# Patient Record
Sex: FEMALE | Race: OTHER | NOT HISPANIC OR LATINO
[De-identification: names, ages, dates, MRNs, and addresses within clinical notes are randomized per-mention and may not be internally consistent; named-entity substitution may affect disease eponyms.]

---

## 2017-06-27 ENCOUNTER — APPOINTMENT (OUTPATIENT)
Dept: CARDIOLOGY | Facility: CLINIC | Age: 67
End: 2017-06-27

## 2017-11-30 ENCOUNTER — CHARTING TRANS (OUTPATIENT)
Dept: OTHER | Age: 67
End: 2017-11-30

## 2017-12-12 ENCOUNTER — APPOINTMENT (OUTPATIENT)
Dept: CARDIOLOGY | Facility: CLINIC | Age: 67
End: 2017-12-12
Payer: MEDICARE

## 2017-12-12 VITALS
HEART RATE: 66 BPM | SYSTOLIC BLOOD PRESSURE: 120 MMHG | WEIGHT: 190 LBS | DIASTOLIC BLOOD PRESSURE: 80 MMHG | BODY MASS INDEX: 34.96 KG/M2 | HEIGHT: 62 IN

## 2017-12-12 DIAGNOSIS — Z80.3 FAMILY HISTORY OF MALIGNANT NEOPLASM OF BREAST: ICD-10-CM

## 2017-12-12 PROCEDURE — 93306 TTE W/DOPPLER COMPLETE: CPT

## 2017-12-12 PROCEDURE — 99214 OFFICE O/P EST MOD 30 MIN: CPT

## 2018-01-12 ENCOUNTER — MEDICATION RENEWAL (OUTPATIENT)
Age: 68
End: 2018-01-12

## 2018-01-22 ENCOUNTER — RX RENEWAL (OUTPATIENT)
Age: 68
End: 2018-01-22

## 2018-03-15 ENCOUNTER — MEDICATION RENEWAL (OUTPATIENT)
Age: 68
End: 2018-03-15

## 2018-06-12 ENCOUNTER — NON-APPOINTMENT (OUTPATIENT)
Age: 68
End: 2018-06-12

## 2018-06-12 ENCOUNTER — APPOINTMENT (OUTPATIENT)
Dept: CARDIOLOGY | Facility: CLINIC | Age: 68
End: 2018-06-12
Payer: MEDICARE

## 2018-06-12 VITALS
WEIGHT: 197 LBS | DIASTOLIC BLOOD PRESSURE: 72 MMHG | SYSTOLIC BLOOD PRESSURE: 118 MMHG | BODY MASS INDEX: 36.25 KG/M2 | HEIGHT: 62 IN | OXYGEN SATURATION: 95 % | HEART RATE: 61 BPM

## 2018-06-12 DIAGNOSIS — Z87.09 PERSONAL HISTORY OF OTHER DISEASES OF THE RESPIRATORY SYSTEM: ICD-10-CM

## 2018-06-12 DIAGNOSIS — Z87.898 PERSONAL HISTORY OF OTHER SPECIFIED CONDITIONS: ICD-10-CM

## 2018-06-12 PROCEDURE — 93000 ELECTROCARDIOGRAM COMPLETE: CPT

## 2018-06-12 PROCEDURE — 99214 OFFICE O/P EST MOD 30 MIN: CPT

## 2018-09-24 ENCOUNTER — MEDICATION RENEWAL (OUTPATIENT)
Age: 68
End: 2018-09-24

## 2018-12-17 ENCOUNTER — RX RENEWAL (OUTPATIENT)
Age: 68
End: 2018-12-17

## 2019-01-24 ENCOUNTER — MEDICATION RENEWAL (OUTPATIENT)
Age: 69
End: 2019-01-24

## 2019-02-22 ENCOUNTER — RECORD ABSTRACTING (OUTPATIENT)
Age: 69
End: 2019-02-22

## 2019-02-25 ENCOUNTER — APPOINTMENT (OUTPATIENT)
Dept: CARDIOLOGY | Facility: CLINIC | Age: 69
End: 2019-02-25
Payer: MEDICARE

## 2019-02-25 VITALS
SYSTOLIC BLOOD PRESSURE: 110 MMHG | BODY MASS INDEX: 37.17 KG/M2 | OXYGEN SATURATION: 95 % | HEART RATE: 90 BPM | HEIGHT: 62 IN | WEIGHT: 202 LBS | DIASTOLIC BLOOD PRESSURE: 70 MMHG

## 2019-02-25 PROCEDURE — 99214 OFFICE O/P EST MOD 30 MIN: CPT

## 2019-02-25 NOTE — DISCUSSION/SUMMARY
[FreeTextEntry1] : \par #1 atrial fibrillation/chronic: Stable labs. Continue rate control. Continue anticoagulation. Watch for bleeding. Repeat CBC, CMP, in 6 months.\par \par #2 history of nonischemic cardiomyopathy with an single-vessel coronary artery disease. Nonischemic. Iimproved LV systolic function appears stable. Echocardiogram ordered for followup of ejection fraction. Also, to evaluate pulmonary artery systolic pressure and mitral regurgitation.Class II functional status. Continue present medications.\par \par #3 coronary atherosclerosis. A CCS class 0. On beta blockers be on statin therapy. On chronic anticoagulation. Not on aspirin. Weight reduction recommended. Ejection fraction preserved at present.\par LDL cholesterol is 72. Low dose of atorvastatin. Unable to tolerate higher dosage.\par \par #4 hyperglycemia. Low carbohydrate diet. Decreasing leg pain, increasing exercise.\par \par #5 Carotid Doppler study has been ordered for evaluation of carotid atherosclerosis in presence of history of coronary atherosclerosis and smoking.\par Her age, former history of smoking, coronary atherosclerotic vascular disease. She is also recommended to have this abdominal aortic ultrasound to rule out any significant abdominal aortic aneurysm.\par BMI goal closer to 25. Counseling done in relation to diet, exercise, and weight.\par \par Counseling regarding low saturated fat, salt and carbohydrate intake was reviewed. Active lifestyle and regular. Exercise along with weight management is advised.\par All the above were at length reviewed. Answered all the questions. Thank you very much for this kind referral. Please do not hesitate to give me a call for any question.\par Part of this transcription was done with voice recognition software and phonetically similar errors are common. I apologize for that. Please donot hesitate to call for any questions due to above.\par

## 2019-02-25 NOTE — ASSESSMENT
[FreeTextEntry1] : Reviewed on June 12, 2018.\par EKG ordered and interpreted by me on June 12, 2018. Indication atrial fibrillation. Interpretation. Atrial fibrillation. Stable ventricular rate nonspecific ST-T changes.\par \par As reviewed her labs. January 2019. Stable. CBC, CMP. Glucose mildly elevated

## 2019-02-25 NOTE — REASON FOR VISIT
[Follow-Up - Clinic] : a clinic follow-up of [Atrial Fibrillation] : atrial fibrillation [Cardiomyopathy] : cardiomyopathy [Hyperlipidemia] : hyperlipidemia [FreeTextEntry1] : \par 68-year-old comes in for followup consultation today. Review her labs.\par She has not been controlling her diet and does not do regular exercise.\par She has gained weight.\par She has no chest\par No PND, orthopnea, palpitation.\par No bleeding complications.\par No nausea, vomiting, diarrhea.\par No change in medications.\par No recent hospital admission.

## 2019-02-25 NOTE — HISTORY OF PRESENT ILLNESS
[FreeTextEntry1] : Her other active medical problems as noted below.\par \par Atrial Fibrillation: persistent rate controlled. On anticoagulation\par \par •Coronary artery disease, one vessel, small, not intervenable candidate. Continue risk factor modification. No chest pain.CCS class is 0.  On anticoagulation.  Not on aspirin due to anticoagulation and risk of bleeding.  On beta blockers.  On statin therapy.  No active smoking. Former smoker\par \par •Cardiomyopathy, idiopathic, on maximized dose of ACE inhibitor and beta-blockers, improved LVEF.Class 1-2.  Nonsmoker.  No significant alcohol intake.  LV ejection fraction normalized.\par \par •Dyslipidemia, stable on statin therapy and lifestyle modifications.\par \par •abnormal glucose . prediabetes\par

## 2019-02-25 NOTE — PHYSICAL EXAM
[General Appearance - Well Developed] : well developed [Normal Appearance] : normal appearance [Well Groomed] : well groomed [General Appearance - Well Nourished] : well nourished [No Deformities] : no deformities [General Appearance - In No Acute Distress] : no acute distress [Normal Conjunctiva] : the conjunctiva exhibited no abnormalities [No Oral Pallor] : no oral pallor [Normal Jugular Venous A Waves Present] : normal jugular venous A waves present [Normal Jugular Venous V Waves Present] : normal jugular venous V waves present [No Jugular Venous Willams A Waves] : no jugular venous willams A waves [Respiration, Rhythm And Depth] : normal respiratory rhythm and effort [Exaggerated Use Of Accessory Muscles For Inspiration] : no accessory muscle use [Auscultation Breath Sounds / Voice Sounds] : lungs were clear to auscultation bilaterally [Heart Rate And Rhythm] : heart rate and rhythm were normal [Arterial Pulses Normal] : the arterial pulses were normal [Edema] : no peripheral edema present [Veins - Varicosity Changes] : no varicosital changes were noted in the lower extremities [FreeTextEntry1] : irregular irregular, msm , Decreased carotid upstroke. No gallop, or rub [Abnormal Walk] : normal gait [Gait - Sufficient For Exercise Testing] : the gait was sufficient for exercise testing [Nail Clubbing] : no clubbing of the fingernails [Cyanosis, Localized] : no localized cyanosis [Petechial Hemorrhages (___cm)] : no petechial hemorrhages [Skin Color & Pigmentation] : normal skin color and pigmentation [] : no rash [No Venous Stasis] : no venous stasis [No Xanthoma] : no  xanthoma was observed [Oriented To Time, Place, And Person] : oriented to person, place, and time [Affect] : the affect was normal [Mood] : the mood was normal [No Anxiety] : not feeling anxious

## 2019-03-20 ENCOUNTER — APPOINTMENT (OUTPATIENT)
Dept: CARDIOLOGY | Facility: CLINIC | Age: 69
End: 2019-03-20
Payer: MEDICARE

## 2019-03-20 PROCEDURE — 93306 TTE W/DOPPLER COMPLETE: CPT

## 2019-03-20 PROCEDURE — 93979 VASCULAR STUDY: CPT

## 2019-03-20 PROCEDURE — 93880 EXTRACRANIAL BILAT STUDY: CPT

## 2019-03-21 ENCOUNTER — APPOINTMENT (OUTPATIENT)
Dept: CARDIOLOGY | Facility: CLINIC | Age: 69
End: 2019-03-21

## 2019-07-20 ENCOUNTER — RX RENEWAL (OUTPATIENT)
Age: 69
End: 2019-07-20

## 2019-08-07 DIAGNOSIS — E83.52 HYPERCALCEMIA: ICD-10-CM

## 2019-08-26 ENCOUNTER — NON-APPOINTMENT (OUTPATIENT)
Age: 69
End: 2019-08-26

## 2019-08-26 ENCOUNTER — APPOINTMENT (OUTPATIENT)
Dept: CARDIOLOGY | Facility: CLINIC | Age: 69
End: 2019-08-26
Payer: MEDICARE

## 2019-08-26 VITALS
OXYGEN SATURATION: 97 % | DIASTOLIC BLOOD PRESSURE: 70 MMHG | WEIGHT: 200 LBS | SYSTOLIC BLOOD PRESSURE: 116 MMHG | BODY MASS INDEX: 36.8 KG/M2 | HEART RATE: 80 BPM | HEIGHT: 62 IN

## 2019-08-26 PROCEDURE — 93000 ELECTROCARDIOGRAM COMPLETE: CPT

## 2019-08-26 PROCEDURE — 99214 OFFICE O/P EST MOD 30 MIN: CPT

## 2019-08-26 NOTE — ASSESSMENT
[FreeTextEntry1] : Reviewed on June 12, 2018.\par EKG ordered and interpreted by me on June 12, 2018. Indication atrial fibrillation. Interpretation. Atrial fibrillation. Stable ventricular rate nonspecific ST-T changes.\par \par Reviewed on August 26, 2019\par Labs from August 13, 2019 review IPTH 131\par Labs from August 6, 2019 showed stable. CBC, CMP, with glucose and calcium elevation. LDL 76, total cholesterol 143

## 2019-08-26 NOTE — DISCUSSION/SUMMARY
[FreeTextEntry1] : \par #1 atrial fibrillation/chronic: Stable labs. Continue rate control. Continue anticoagulation. Watch for bleeding. Repeat CBC, CMP, in 6 months.\par \par #2 history of nonischemic cardiomyopathy with an single-vessel coronary artery disease. Nonischemic. Iimproved LV systolic function appears stable. Echocardiogram ordered for followup of ejection fraction. Also, to evaluate pulmonary artery systolic pressure and mitral regurgitation.Class II functional status. Continue present medications.\par \par #3 coronary atherosclerosis. A CCS class 0. On beta blockers be on statin therapy. On chronic anticoagulation. Not on aspirin. Weight reduction recommended. Ejection fraction preserved at present.\par LDL cholesterol is 72. Low dose of atorvastatin. Unable to tolerate higher dosage.\par \par #4 Hyperglycemia and hypercalcemia. Low carbohydrate diet recommended. Elevated PTH suggests hyperparathyroidism. Recommended to see endocrinologist. Multiple endocrinologist name given to her. She understands the importance of scheduling appointment and follow up. She can also followup with her primary care physician.\par If there is any problem in making, appointments. She'll contact me immediately to\par \par #5 Mild nonobstructive carotid atherosclerosis. Continue lifestyle and her prescription medications.No significant abdominal aortic aneurysm\par \par Counseling regarding low saturated fat, salt and carbohydrate intake was reviewed. Active lifestyle and regular. Exercise along with weight management is advised.\par All the above were at length reviewed. Answered all the questions. Thank you very much for this kind referral. Please do not hesitate to give me a call for any question.\par Part of this transcription was done with voice recognition software and phonetically similar errors are common. I apologize for that. Please donot hesitate to call for any questions due to above.\par

## 2019-08-26 NOTE — REASON FOR VISIT
[Atrial Fibrillation] : atrial fibrillation [Follow-Up - Clinic] : a clinic follow-up of [Cardiomyopathy] : cardiomyopathy [Hyperlipidemia] : hyperlipidemia [FreeTextEntry1] : 68-year-old comes in for followup consultation today. Review her labs.\par She has not been controlling her diet and does not do regular exercise.\par She has gained weight.\par She has no chest\par No PND, orthopnea, palpitation.\par No bleeding complications.\par No nausea, vomiting, diarrhea.\par No change in medications.\par No recent hospital admission.

## 2019-08-26 NOTE — PHYSICAL EXAM
[General Appearance - Well Developed] : well developed [Normal Appearance] : normal appearance [General Appearance - Well Nourished] : well nourished [Well Groomed] : well groomed [General Appearance - In No Acute Distress] : no acute distress [No Deformities] : no deformities [No Oral Pallor] : no oral pallor [Normal Conjunctiva] : the conjunctiva exhibited no abnormalities [Normal Jugular Venous A Waves Present] : normal jugular venous A waves present [Normal Jugular Venous V Waves Present] : normal jugular venous V waves present [No Jugular Venous Willams A Waves] : no jugular venous willams A waves [Auscultation Breath Sounds / Voice Sounds] : lungs were clear to auscultation bilaterally [Exaggerated Use Of Accessory Muscles For Inspiration] : no accessory muscle use [Respiration, Rhythm And Depth] : normal respiratory rhythm and effort [Arterial Pulses Normal] : the arterial pulses were normal [Heart Rate And Rhythm] : heart rate and rhythm were normal [FreeTextEntry1] : irregular irregular, msm , Decreased carotid upstroke. No gallop, or rub [Veins - Varicosity Changes] : no varicosital changes were noted in the lower extremities [Edema] : no peripheral edema present [Abnormal Walk] : normal gait [Gait - Sufficient For Exercise Testing] : the gait was sufficient for exercise testing [Nail Clubbing] : no clubbing of the fingernails [Cyanosis, Localized] : no localized cyanosis [Petechial Hemorrhages (___cm)] : no petechial hemorrhages [Skin Color & Pigmentation] : normal skin color and pigmentation [] : no ischemic changes [No Xanthoma] : no  xanthoma was observed [No Venous Stasis] : no venous stasis [Affect] : the affect was normal [Oriented To Time, Place, And Person] : oriented to person, place, and time [No Anxiety] : not feeling anxious [Mood] : the mood was normal

## 2019-12-19 ENCOUNTER — RX RENEWAL (OUTPATIENT)
Age: 69
End: 2019-12-19

## 2020-02-07 ENCOUNTER — NON-APPOINTMENT (OUTPATIENT)
Age: 70
End: 2020-02-07

## 2020-02-07 ENCOUNTER — APPOINTMENT (OUTPATIENT)
Dept: CARDIOLOGY | Facility: CLINIC | Age: 70
End: 2020-02-07
Payer: MEDICARE

## 2020-02-07 VITALS
WEIGHT: 200 LBS | HEART RATE: 100 BPM | OXYGEN SATURATION: 95 % | SYSTOLIC BLOOD PRESSURE: 112 MMHG | HEIGHT: 62 IN | BODY MASS INDEX: 36.8 KG/M2 | DIASTOLIC BLOOD PRESSURE: 68 MMHG

## 2020-02-07 DIAGNOSIS — E04.1 NONTOXIC SINGLE THYROID NODULE: ICD-10-CM

## 2020-02-07 PROCEDURE — 99215 OFFICE O/P EST HI 40 MIN: CPT

## 2020-02-07 PROCEDURE — 93000 ELECTROCARDIOGRAM COMPLETE: CPT

## 2020-02-07 NOTE — REASON FOR VISIT
[Follow-Up - Clinic] : a clinic follow-up of [Atrial Fibrillation] : atrial fibrillation [Anticoagulation] : anticoagulation [Cardiomyopathy] : cardiomyopathy [Hyperlipidemia] : hyperlipidemia [FreeTextEntry1] : 68-year-old comes in for followup consultation today for preoperative cardiac assessment and recommendation regarding holding anticoagulation prior to her parathyroidectomy planned next Thursday\par She has no chest\par She has stable dyspnea on exertion\par No PND, orthopnea, palpitation.\par No bleeding complications.\par No nausea, vomiting, diarrhea.\par No change in medications.\par No recent hospital admission.

## 2020-02-07 NOTE — PHYSICAL EXAM
[Normal Appearance] : normal appearance [General Appearance - Well Developed] : well developed [General Appearance - Well Nourished] : well nourished [Well Groomed] : well groomed [General Appearance - In No Acute Distress] : no acute distress [No Deformities] : no deformities [Normal Conjunctiva] : the conjunctiva exhibited no abnormalities [No Oral Pallor] : no oral pallor [Normal Jugular Venous A Waves Present] : normal jugular venous A waves present [Normal Jugular Venous V Waves Present] : normal jugular venous V waves present [No Jugular Venous Willams A Waves] : no jugular venous willams A waves [Exaggerated Use Of Accessory Muscles For Inspiration] : no accessory muscle use [Respiration, Rhythm And Depth] : normal respiratory rhythm and effort [Heart Rate And Rhythm] : heart rate and rhythm were normal [Auscultation Breath Sounds / Voice Sounds] : lungs were clear to auscultation bilaterally [Arterial Pulses Normal] : the arterial pulses were normal [Edema] : no peripheral edema present [Veins - Varicosity Changes] : no varicosital changes were noted in the lower extremities [Gait - Sufficient For Exercise Testing] : the gait was sufficient for exercise testing [Abnormal Walk] : normal gait [Cyanosis, Localized] : no localized cyanosis [Nail Clubbing] : no clubbing of the fingernails [Petechial Hemorrhages (___cm)] : no petechial hemorrhages [] : no rash [Skin Color & Pigmentation] : normal skin color and pigmentation [No Venous Stasis] : no venous stasis [Oriented To Time, Place, And Person] : oriented to person, place, and time [No Xanthoma] : no  xanthoma was observed [Affect] : the affect was normal [Mood] : the mood was normal [No Anxiety] : not feeling anxious [FreeTextEntry1] : irregular irregular, msm , Decreased carotid upstroke. No gallop, or rub

## 2020-02-07 NOTE — DISCUSSION/SUMMARY
[FreeTextEntry1] : 69-year-old female with above medical history and active medical problems as noted below\par \par At present, there are no active cardiac conditions.\par No recent unstable coronary syndrome, decompensated heart failure, severe valvular heart disease or significant dysrhythmias.\par The clinical benefit of the proposed procedure outweighs the associated cardiovascular risk.\par Risk not attenuated with further cardiovascular testing.\par Prior testing as outlined above.\par Optimized from a cardiovascular perspective.\par Control blood pressure, heart rate, pulse oximetry perioperatively.\par If required appropriate intravenous medication for the outpatient oral medication for blood pressure, heart rate control.\par DVT prophylaxis as per indication.\par \par For surgery and invasive procedures, recommend to discontinue Xarelto at least 48-72 hours prior to elective surgery or invasive procedures with a moderate-to-high risk of clinically significant bleeding. Anticoagulation bridging during the 48-72 hours xarelto is interrupted and prior to the intervention is not generally required. Reinitiate apixaban when adequate hemostasis has been achieved.  If oral therapy cannot be administered, then consider administration of a parenteral anticoagulant. Note excess discontinuation of any oral anticoagulant, including apixaban, increases the risk of thrombotic events. Patient was counseled and verbalizes understanding of these associated risks\par \par Other chronic problems\par #1 atrial fibrillation/chronic: Stable labs. Continue rate control. Continue anticoagulation. Watch for bleeding. \par #2 history of nonischemic cardiomyopathy with an single-vessel coronary artery disease. Nonischemic. Iimproved LV systolic function appears stable March 2019.  .Class II functional status. Continue present medications.  We will follow-up on echocardiogram prior to next office visit.\par #3 coronary atherosclerosis. A CCS class 0. On beta blockers be on statin therapy. On chronic anticoagulation. Not on aspirin. Weight reduction recommended. Ejection fraction preserved at present.\par LDL cholesterol is 72. Low dose of atorvastatin. Unable to tolerate higher dosage.\par #4 Hyperglycemia and hypercalcemia. Low carbohydrate diet recommended.  Continue follow-up with endocrinologist\par #5 Mild nonobstructive carotid atherosclerosis. Continue lifestyle and her prescription medications.No significant abdominal aortic aneurysm\par \par Counseling regarding low saturated fat, salt and carbohydrate intake was reviewed. Active lifestyle and regular. Exercise along with weight management is advised.\par All the above were at length reviewed. Answered all the questions. Thank you very much for this kind referral. Please do not hesitate to give me a call for any question.\par Part of this transcription was done with voice recognition software and phonetically similar errors are common. I apologize for that. Please donot hesitate to call for any questions due to above.\par

## 2020-02-07 NOTE — CARDIOLOGY SUMMARY
[LVEF ___%] : LVEF [unfilled]% [___] : [unfilled] [Mild] : mild mitral regurgitation [Enlarged] : enlarged LA size [None] : no pulmonary hypertension

## 2020-02-07 NOTE — HISTORY OF PRESENT ILLNESS
[FreeTextEntry1] : Her other active medical problems as noted below.\par \par Atrial Fibrillation: persistent rate controlled. On anticoagulation\par \par •Coronary artery disease, one vessel, small, not intervenable candidate. Continue risk factor modification. No chest pain.CCS class is 0.  On anticoagulation.  Not on aspirin due to anticoagulation and risk of bleeding.  On beta blockers.  On statin therapy.  No active smoking. Former smoker\par \par •Cardiomyopathy, idiopathic, on maximized dose of ACE inhibitor and beta-blockers, improved LVEF.Class 1-2.  Nonsmoker.  No significant alcohol intake.  LV ejection fraction normalized.\par \par •Dyslipidemia, stable on statin therapy and lifestyle modifications.\par \par •abnormal glucose . prediabetes\par \par Hyperparathyroidism with hypercalcemia\par Thyroid nodule\par

## 2020-02-07 NOTE — ASSESSMENT
[FreeTextEntry1] : Reviewed on June 12, 2018.\par EKG ordered and interpreted by me on June 12, 2018. Indication atrial fibrillation. Interpretation. Atrial fibrillation. Stable ventricular rate nonspecific ST-T changes.\par \par Reviewed on August 26, 2019\par Labs from August 13, 2019 review IPTH 131\par Labs from August 6, 2019 showed stable. CBC, CMP, with glucose and calcium elevation. LDL 76, total cholesterol 143\par \par reviewed 2/7/20\par ekg: afib/flutter non specifc stt changes\par \par Recent labs reviewed creatinine 0.8 potassium 4.8 sodium 140 GFR normal

## 2020-05-19 ENCOUNTER — RX RENEWAL (OUTPATIENT)
Age: 70
End: 2020-05-19

## 2020-08-10 ENCOUNTER — APPOINTMENT (OUTPATIENT)
Dept: CARDIOLOGY | Facility: CLINIC | Age: 70
End: 2020-08-10
Payer: MEDICARE

## 2020-08-10 PROCEDURE — 93306 TTE W/DOPPLER COMPLETE: CPT

## 2020-09-08 ENCOUNTER — APPOINTMENT (OUTPATIENT)
Dept: CARDIOLOGY | Facility: CLINIC | Age: 70
End: 2020-09-08
Payer: MEDICARE

## 2020-09-08 VITALS
BODY MASS INDEX: 36.8 KG/M2 | WEIGHT: 200 LBS | HEIGHT: 62 IN | HEART RATE: 90 BPM | SYSTOLIC BLOOD PRESSURE: 120 MMHG | OXYGEN SATURATION: 97 % | DIASTOLIC BLOOD PRESSURE: 78 MMHG

## 2020-09-08 DIAGNOSIS — E21.3 HYPERPARATHYROIDISM, UNSPECIFIED: ICD-10-CM

## 2020-09-08 DIAGNOSIS — R73.03 PREDIABETES.: ICD-10-CM

## 2020-09-08 PROCEDURE — 99214 OFFICE O/P EST MOD 30 MIN: CPT

## 2020-09-08 NOTE — ASSESSMENT
[FreeTextEntry1] : MARY CHOE is a 69 year old F who presents today Sep 08, 2020 with the above history and the following active issues:\par \par #1 atrial fibrillation/chronic: Stable labs. Continue rate control. Continue anticoagulation. Watch for bleeding. \par \par #2 history of nonischemic cardiomyopathy with an single-vessel coronary artery disease. Nonischemic. Iimproved LV systolic function appears stable March 2019..Class II functional status. Continue present medications. Patient endorsed some COLE and BL edema; sometimes in the setting of dietary indiscretion. Script given for Lasix 20mg daily PRN.\par \par #3 coronary atherosclerosis. A CCS class 0. On beta blockers be on statin therapy. On chronic anticoagulation. Not on aspirin. Weight reduction recommended. Ejection fraction preserved at present.\par LDL cholesterol is 72. Low dose of atorvastatin. Unable to tolerate higher dosage.\par \par #4 Hyperglycemia and hypercalcemia. Low carbohydrate diet recommended. Continue follow-up with endocrinologist\par \par #5 Mild nonobstructive carotid atherosclerosis. Continue lifestyle and her prescription medications.No significant abdominal aortic aneurysm\par \par #6. Dilated ascending aorta and trivial pericardial effusion seen on echo 8/10/20. CT chest with IV contrast ordered for further evaluation.\par \par Heart healthy diet and lifestyle discussed.\par \par Ongoing f/u with PCP.\par \par F/U results will be discussed over the phone and the patient will follow up in 6 months.\par Discussed red flag symptoms, which would warrant sooner or emergent medical evaluation.\par Any questions and concerns were addressed and resolved.\par \par Sincerely,\par Funmilayo Salgado [facility]\par Patient's history, testing, and plan was reviewed with supervising physician, Dr. Jony Callejas

## 2020-09-08 NOTE — HISTORY OF PRESENT ILLNESS
[FreeTextEntry1] : MARY CHOE is a 69 year old female with a past medical history of:\par \par Atrial Fibrillation: persistent rate controlled. On anticoagulation\par \par •Coronary artery disease, one vessel, small, not intervenable candidate. Continue risk factor modification. No chest pain.CCS class is 0. On anticoagulation. Not on aspirin due to anticoagulation and risk of bleeding. On beta blockers. On statin therapy. No active smoking. Former smoker\par \par •Cardiomyopathy, idiopathic, on maximized dose of ACE inhibitor and beta-blockers, improved LVEF.Class 1-2. Nonsmoker. No significant alcohol intake. LV ejection fraction normalized.\par \par •Dyslipidemia, stable on statin therapy and lifestyle modifications.\par \par •abnormal glucose . prediabetes\par \par Hyperparathyroidism with hypercalcemia\par Thyroid nodule\par S/p parathyroidectomy.\par \par Last seen 2/7/20. In the interim underwent parathyroidectomy. Presents today 9/8/20 to review the results of her echo. Endorses some COLE and lower extremity edema. Endorses dietary indiscretion. Denies CP, PND, orthopnea, palpitations, dizziness, lightheadedness, syncope, near syncope, and claudication. Not on a formal exercise regimen, but active at work ([employer]).\par \par Testing:\par \par Reviewed on June 12, 2018.\par \par EKG June 12, 2018. Indication atrial fibrillation. Interpretation. Atrial fibrillation. Stable ventricular rate nonspecific ST-T changes.\par \par Reviewed on August 26, 2019\par \par Labs from August 13, 2019 review IPTH 131\par \par Labs from August 6, 2019 showed stable. CBC, CMP, with glucose and calcium elevation. LDL 76, total cholesterol 143\par \par Carotids 3/20/19: Mild nonobstructive carotid dz seen BL.\par \par Abd u/s 3/20/19: No evidence of AAA. Mild plaque.\par \par reviewed 2/7/20\par ekg: afib/flutter non specifc stt changes\par \par labs reviewed creatinine 0.8 potassium 4.8 sodium 140 GFR normal. \par \par Echo 8/10/20:EF 55%. Mild MR. Mild AR. Dilated ascending aorta 4.5 cm. Severely dilated LA. No segmental wall motion abnormalities. Mild DD. Mild JACK. Mild TR. Trvial circumferential pericardial effusion. Compared with echo 3/20/19, dilated ascending arota.

## 2020-09-08 NOTE — PHYSICAL EXAM
[General Appearance - Well Developed] : well developed [Normal Appearance] : normal appearance [Well Groomed] : well groomed [No Deformities] : no deformities [General Appearance - Well Nourished] : well nourished [General Appearance - In No Acute Distress] : no acute distress [Normal Conjunctiva] : the conjunctiva exhibited no abnormalities [Eyelids - No Xanthelasma] : the eyelids demonstrated no xanthelasmas [Normal Oral Mucosa] : normal oral mucosa [No Oral Cyanosis] : no oral cyanosis [No Oral Pallor] : no oral pallor [Normal Jugular Venous V Waves Present] : normal jugular venous V waves present [Normal Jugular Venous A Waves Present] : normal jugular venous A waves present [No Jugular Venous Willams A Waves] : no jugular venous willams A waves [Respiration, Rhythm And Depth] : normal respiratory rhythm and effort [Exaggerated Use Of Accessory Muscles For Inspiration] : no accessory muscle use [Auscultation Breath Sounds / Voice Sounds] : lungs were clear to auscultation bilaterally [Heart Rate And Rhythm] : heart rate and rhythm were normal [Heart Sounds] : normal S1 and S2 [Murmurs] : no murmurs present [FreeTextEntry1] : irreg irreg [Abdomen Soft] : soft [Abdomen Tenderness] : non-tender [Abdomen Mass (___ Cm)] : no abdominal mass palpated [Gait - Sufficient For Exercise Testing] : the gait was sufficient for exercise testing [Abnormal Walk] : normal gait [Nail Clubbing] : no clubbing of the fingernails [Cyanosis, Localized] : no localized cyanosis [Petechial Hemorrhages (___cm)] : no petechial hemorrhages [Skin Color & Pigmentation] : normal skin color and pigmentation [] : no rash [No Venous Stasis] : no venous stasis [Skin Lesions] : no skin lesions [No Skin Ulcers] : no skin ulcer [No Xanthoma] : no  xanthoma was observed [Oriented To Time, Place, And Person] : oriented to person, place, and time [Affect] : the affect was normal [Mood] : the mood was normal [No Anxiety] : not feeling anxious

## 2020-09-30 ENCOUNTER — OUTPATIENT (OUTPATIENT)
Dept: OUTPATIENT SERVICES | Facility: HOSPITAL | Age: 70
LOS: 1 days | End: 2020-09-30
Payer: MEDICARE

## 2020-09-30 PROCEDURE — 71260 CT THORAX DX C+: CPT | Mod: 26

## 2020-10-06 ENCOUNTER — APPOINTMENT (OUTPATIENT)
Dept: CARDIOLOGY | Facility: CLINIC | Age: 70
End: 2020-10-06
Payer: MEDICARE

## 2020-10-06 VITALS — BODY MASS INDEX: 36.8 KG/M2 | WEIGHT: 200 LBS | HEIGHT: 62 IN

## 2020-10-06 DIAGNOSIS — R91.1 SOLITARY PULMONARY NODULE: ICD-10-CM

## 2020-10-06 PROCEDURE — 99443: CPT | Mod: 95

## 2020-10-06 NOTE — ASSESSMENT
[FreeTextEntry1] : Reviewed on June 12, 2018.\par EKG ordered and interpreted by me on June 12, 2018. Indication atrial fibrillation. Interpretation. Atrial fibrillation. Stable ventricular rate nonspecific ST-T changes.\par \par Reviewed on August 26, 2019\par Labs from August 13, 2019 review IPTH 131\par Labs from August 6, 2019 showed stable. CBC, CMP, with glucose and calcium elevation. LDL 76, total cholesterol 143\par \par reviewed 2/7/20\par ekg: afib/flutter non specifc stt changes\par \par Recent labs reviewed creatinine 0.8 potassium 4.8 sodium 140 GFR normal\par \par Reviewed October 6, 2020.\par CT scan of the chest abdomen and pelvis.  Maximum dimension of ascending aorta 4.2 cm.  Aortic and coronary calcification.  Minimal pericardial effusion noted\par Lung nodule noted about 4 mm.  Right lower lobe\par 1 cm thyroid nodule noted\par 1.4 cm tubular type opacity right lower lobe.\par 1.6 cm right adrenal nodule\par 0.5 cm hypodensity at the tail of the pancreas not characterized well probably representing small sidebranch intraductal papillary mucinous neoplasm\par \par Echocardiogram August 10, 2020 EF 55% mild mitral and aortic regurgitation dilated ascending aorta 4.5 cm RVSP 26 trace circumferential pericardial effusion

## 2020-10-06 NOTE — DISCUSSION/SUMMARY
[FreeTextEntry1] : 70-year-old female with above medical history and active medical problems as noted below\par \par \par Extensive review of echocardiogram and CT angios of chest abdomen pelvis was done.\par Thoracic aortic aneurysm noted.  Stable without dissection on CTA.  No abdominal or descending thoracic aneurysm\par Calcification and atherosclerosis of aorta and coronary noted.\par There were other incidental findings which includes thyroid nodule which is known\par Adrenal nodule/4 mm right lower lobe nodule in the lungs/pancreatic tail hypodensity.\par \par After extensive review of the findings CT scan of the chest for pulmonary nodule will be done in 3 months considering history of former smoker\par MRI of abdomen with contrast will be done to further evaluate pancreatic tail finding and adrenal nodule\par She needs to find a primary care physician and based on above testing she would be referred to a gastroenterologist/endocrinologist as needed.\par She understands importance of above evaluation.  She understands importance of communication to avoid morbidity mortality related to missing diagnosis.\par She will contact me after the test so that appropriate review can be done\par \par Other chronic problems\par #1 atrial fibrillation/chronic: Stable labs. Continue rate control. Continue anticoagulation. Watch for bleeding. \par #2 history of nonischemic cardiomyopathy with an single-vessel coronary artery disease. Nonischemic. Iimproved LV systolic function appears stable March 2019.  .Class II functional status. Continue present medications.  We will follow-up on echocardiogram prior to next office visit.\par #3 coronary atherosclerosis. A CCS class 0. On beta blockers be on statin therapy. On chronic anticoagulation. Not on aspirin. Weight reduction recommended. Ejection fraction preserved at present.\par LDL cholesterol is 72. Low dose of atorvastatin. Unable to tolerate higher dosage.\par #4 Hyperglycemia and hypercalcemia. Low carbohydrate diet recommended.  Continue follow-up with endocrinologist\par #5 Mild nonobstructive carotid atherosclerosis. Continue lifestyle and her prescription medications.No significant abdominal aortic aneurysm\par \par Counseling regarding low saturated fat, salt and carbohydrate intake was reviewed. Active lifestyle and regular. Exercise along with weight management is advised.\par All the above were at length reviewed. Answered all the questions. Thank you very much for this kind referral. Please do not hesitate to give me a call for any question.\par Part of this transcription was done with voice recognition software and phonetically similar errors are common. I apologize for that. Please donot hesitate to call for any questions due to above.\par

## 2020-10-06 NOTE — HISTORY OF PRESENT ILLNESS
[FreeTextEntry1] : Consent: Patient consented to telephone visit.\par Time: A total of 25 minutes was spent in review of pertinent medical records, discussion with the patient, evaluation of patient problem, and coordination of a care plan as part of this telephone visit.\par Physical examination was not performed as a  the risk of COVID-19 cross-contamination to be greater than the benefit to the patient conferred by the physical examination.\par \par 70-year-old was seen in 2 way audio visit today.\par Extensive review of her echocardiogram and CT angios of chest abdomen and pelvis was done.She has no changes in her symptoms\par No new chest pain shortness breath PND orthopnea palpitation lightheaded dizziness\par No visual disturbances focal weakness\par No nausea vomiting diarrhea hemoptysis hematemesis melena dark-colored stool.\par No recent hospital admission\par \par \par \par Her other active medical problems as noted below.\par \par Atrial Fibrillation: persistent rate controlled. On anticoagulation\par \par •Coronary artery disease, one vessel, small, not intervenable candidate. Continue risk factor modification. No chest pain.CCS class is 0.  On anticoagulation.  Not on aspirin due to anticoagulation and risk of bleeding.  On beta blockers.  On statin therapy.  No active smoking. Former smoker\par \par •Cardiomyopathy, idiopathic, on maximized dose of ACE inhibitor and beta-blockers, improved LVEF.Class 1-2.  Nonsmoker.  No significant alcohol intake.  LV ejection fraction normalized.\par \par •Dyslipidemia, stable on statin therapy and lifestyle modifications.\par \par •abnormal glucose . prediabetes\par \par Hyperparathyroidism with hypercalcemia\par Thyroid nodule\par

## 2020-10-14 ENCOUNTER — OUTPATIENT (OUTPATIENT)
Dept: OUTPATIENT SERVICES | Facility: HOSPITAL | Age: 70
LOS: 1 days | End: 2020-10-14
Payer: MEDICARE

## 2020-10-14 PROCEDURE — 74183 MRI ABD W/O CNTR FLWD CNTR: CPT | Mod: 26

## 2020-10-20 ENCOUNTER — NON-APPOINTMENT (OUTPATIENT)
Age: 70
End: 2020-10-20

## 2020-10-23 ENCOUNTER — RX RENEWAL (OUTPATIENT)
Age: 70
End: 2020-10-23

## 2020-11-09 ENCOUNTER — APPOINTMENT (OUTPATIENT)
Dept: CARDIOLOGY | Facility: CLINIC | Age: 70
End: 2020-11-09

## 2020-12-01 ENCOUNTER — APPOINTMENT (OUTPATIENT)
Dept: CARDIOLOGY | Facility: CLINIC | Age: 70
End: 2020-12-01
Payer: MEDICARE

## 2020-12-01 VITALS — HEIGHT: 62 IN | WEIGHT: 200 LBS | BODY MASS INDEX: 36.8 KG/M2

## 2020-12-01 DIAGNOSIS — E27.8 OTHER SPECIFIED DISORDERS OF ADRENAL GLAND: ICD-10-CM

## 2020-12-01 DIAGNOSIS — K86.2 CYST OF PANCREAS: ICD-10-CM

## 2020-12-01 PROCEDURE — 99442: CPT | Mod: 95

## 2020-12-01 NOTE — HISTORY OF PRESENT ILLNESS
[FreeTextEntry1] : .Consent: Patient consented to telephone visit.\par Time: A total of 12 minutes was spent in review of pertinent medical records, discussion with the patient, evaluation of patient problem, and coordination of a care plan as part of this telephone visit.\par Physical examination was not performed as a  the risk of COVID-19 cross-contamination to be greater than the benefit to the patient conferred by the physical examination.\par \par 70-year-old was seen in 2 way audio visit today.  She is at home I am in [facility] office\par Reviewed abdominal MRI.\par She has seen gastroenterologist.  No worries about pancreatic cyst.  She is to see endocrinologist for adrenal nodule\par Earlier we had done review of her echocardiogram and CT angios of chest abdomen and pelvis was done.She has no changes in her symptoms\par No new chest pain shortness breath PND orthopnea palpitation lightheaded dizziness\par No visual disturbances focal weakness\par No nausea vomiting diarrhea hemoptysis hematemesis melena dark-colored stool.\par No recent hospital admission\par \par Her other active medical problems as noted below.\par \par Atrial Fibrillation: persistent rate controlled. On anticoagulation\par \par Thoracic aortic aneurysm.  4.2 cm on recent CT scan September 30, 2020\par \par •Coronary artery disease, one vessel, small, not intervenable candidate. Continue risk factor modification. No chest pain.CCS class is 0.  On anticoagulation.  Not on aspirin due to anticoagulation and risk of bleeding.  On beta blockers.  On statin therapy.  No active smoking. Former smoker\par \par •Cardiomyopathy, idiopathic, on maximized dose of ACE inhibitor and beta-blockers, improved LVEF.Class 1-2.  Nonsmoker.  No significant alcohol intake.  LV ejection fraction normalized.\par \par •Dyslipidemia, stable on statin therapy and lifestyle modifications.\par \par •abnormal glucose . prediabetes\par \par Hyperparathyroidism with hypercalcemia\par Thyroid nodule\par

## 2020-12-01 NOTE — ASSESSMENT
[FreeTextEntry1] : Reviewed on June 12, 2018.\par EKG ordered and interpreted by me on June 12, 2018. Indication atrial fibrillation. Interpretation. Atrial fibrillation. Stable ventricular rate nonspecific ST-T changes.\par \par Reviewed on August 26, 2019\par Labs from August 13, 2019 review IPTH 131\par Labs from August 6, 2019 showed stable. CBC, CMP, with glucose and calcium elevation. LDL 76, total cholesterol 143\par \par reviewed 2/7/20\par ekg: afib/flutter non specifc stt changes\par \par Recent labs reviewed creatinine 0.8 potassium 4.8 sodium 140 GFR normal\par \par Reviewed October 6, 2020.\par CT scan of the chest abdomen and pelvis.  Maximum dimension of ascending aorta 4.2 cm.  Aortic and coronary calcification.  Minimal pericardial effusion noted\par Lung nodule noted about 4 mm.  Right lower lobe\par 1 cm thyroid nodule noted\par 1.4 cm tubular type opacity right lower lobe.\par 1.6 cm right adrenal nodule\par 0.5 cm hypodensity at the tail of the pancreas not characterized well probably representing small sidebranch intraductal papillary mucinous neoplasm\par \par Echocardiogram August 10, 2020 EF 55% mild mitral and aortic regurgitation dilated ascending aorta 4.5 cm RVSP 26 trace circumferential pericardial effusion\par \par Reviewed on December 1, 2020\par MRI October 14, 2020 adrenal gland nodule right 1.6 cm 2 mm pancreatic abnormality

## 2020-12-01 NOTE — DISCUSSION/SUMMARY
[FreeTextEntry1] : 70-year-old female with above medical history and active medical problems as noted below\par \par MRI of abdomen was reviewed.  Follow-up pancreatic lesion with gastroenterologist.\par Agree with adrenal gland/nodule follow-up with endocrinologist.  Clinically stable blood pressure and heart rate.  \par \par Thoracic aortic aneurysm noted.  4.2 cm.  Stable without dissection on CTA.  No abdominal or descending thoracic aneurysm\par Calcification and atherosclerosis of aorta and coronary noted.\par There were other incidental findings which includes thyroid nodule which is known\par CT scan of the chest for pulmonary nodule should be done in 3 months considering history of former smoker\par \par \par rest of the Other chronic problems management\par #1 atrial fibrillation/chronic: Stable labs. Continue rate control. Continue anticoagulation. Watch for bleeding. \par #2 history of nonischemic cardiomyopathy with an single-vessel coronary artery disease. Nonischemic. Iimproved LV systolic function appears stable March 2019.  .Class II functional status. Continue present medications.  We will follow-up on echocardiogram prior to next office visit.\par #3 coronary atherosclerosis. A CCS class 0. On beta blockers be on statin therapy. On chronic anticoagulation. Not on aspirin. Weight reduction recommended. Ejection fraction preserved at present.\par LDL cholesterol is 72. Low dose of atorvastatin. Unable to tolerate higher dosage.\par #4 Hyperglycemia and hypercalcemia. Low carbohydrate diet recommended.  Continue follow-up with endocrinologist\par #5 Mild nonobstructive carotid atherosclerosis. Continue lifestyle and her prescription medications.No significant abdominal aortic aneurysm\par \par Counseling regarding low saturated fat, salt and carbohydrate intake was reviewed. Active lifestyle and regular. Exercise along with weight management is advised.\par All the above were at length reviewed. Answered all the questions. Thank you very much for this kind referral. Please do not hesitate to give me a call for any question.\par Part of this transcription was done with voice recognition software and phonetically similar errors are common. I apologize for that. Please donot hesitate to call for any questions due to above.\par

## 2021-07-23 ENCOUNTER — NON-APPOINTMENT (OUTPATIENT)
Age: 71
End: 2021-07-23

## 2021-07-23 ENCOUNTER — APPOINTMENT (OUTPATIENT)
Dept: CARDIOLOGY | Facility: CLINIC | Age: 71
End: 2021-07-23
Payer: MEDICARE

## 2021-07-23 VITALS
HEIGHT: 62 IN | DIASTOLIC BLOOD PRESSURE: 68 MMHG | HEART RATE: 89 BPM | OXYGEN SATURATION: 95 % | BODY MASS INDEX: 37.17 KG/M2 | WEIGHT: 202 LBS | SYSTOLIC BLOOD PRESSURE: 112 MMHG

## 2021-07-23 PROCEDURE — 99215 OFFICE O/P EST HI 40 MIN: CPT

## 2021-07-23 PROCEDURE — 93000 ELECTROCARDIOGRAM COMPLETE: CPT

## 2021-07-23 NOTE — HISTORY OF PRESENT ILLNESS
[FreeTextEntry1] : \par Her other active medical problems as noted below.\par \par Atrial Fibrillation: persistent rate controlled. On anticoagulation\par \par Thoracic aortic aneurysm.  4.2 cm on recent CT scan September 30, 2020\par \par •Coronary artery disease, one vessel, small, not intervenable candidate. Continue risk factor modification. No chest pain.CCS class is 0.  On anticoagulation.  Not on aspirin due to anticoagulation and risk of bleeding.  On beta blockers.  On statin therapy.  No active smoking. Former smoker\par \par •Cardiomyopathy, idiopathic, on maximized dose of ACE inhibitor and beta-blockers, improved LVEF.Class 1-2.  Nonsmoker.  No significant alcohol intake.  LV ejection fraction normalized.\par \par •Dyslipidemia, stable on statin therapy and lifestyle modifications.\par \par •abnormal glucose . prediabetes\par \par Hyperparathyroidism with hypercalcemia\par Thyroid nodule\par

## 2021-07-23 NOTE — ASSESSMENT
[FreeTextEntry1] : Reviewed on June 12, 2018.\par EKG ordered and interpreted by me on June 12, 2018. Indication atrial fibrillation. Interpretation. Atrial fibrillation. Stable ventricular rate nonspecific ST-T changes.\par \par Reviewed on August 26, 2019\par Labs from August 13, 2019 review IPTH 131\par Labs from August 6, 2019 showed stable. CBC, CMP, with glucose and calcium elevation. LDL 76, total cholesterol 143\par \par reviewed 2/7/20\par ekg: afib/flutter non specifc stt changes\par \par Recent labs reviewed creatinine 0.8 potassium 4.8 sodium 140 GFR normal\par \par Reviewed October 6, 2020.\par CT scan of the chest abdomen and pelvis.  Maximum dimension of ascending aorta 4.2 cm.  Aortic and coronary calcification.  Minimal pericardial effusion noted\par Lung nodule noted about 4 mm.  Right lower lobe\par 1 cm thyroid nodule noted\par 1.4 cm tubular type opacity right lower lobe.\par 1.6 cm right adrenal nodule\par 0.5 cm hypodensity at the tail of the pancreas not characterized well probably representing small sidebranch intraductal papillary mucinous neoplasm\par \par Echocardiogram August 10, 2020 EF 55% mild mitral and aortic regurgitation dilated ascending aorta 4.5 cm RVSP 26 trace circumferential pericardial effusion\par \par Reviewed on December 1, 2020\par MRI October 14, 2020 adrenal gland nodule right 1.6 cm 2 mm pancreatic abnormality\par \par

## 2021-07-23 NOTE — REVIEW OF SYSTEMS
[Sore Throat] : sore throat [SOB] : shortness of breath [Dyspnea on exertion] : dyspnea during exertion [Chest Discomfort] : no chest discomfort [Lower Ext Edema] : no extremity edema [Leg Claudication] : no intermittent leg claudication [Palpitations] : no palpitations [Orthopnea] : no orthopnea [PND] : no PND [Syncope] : no syncope [Negative] : Respiratory [FreeTextEntry4] : Stuffy nose

## 2021-07-23 NOTE — DISCUSSION/SUMMARY
[FreeTextEntry1] : 70-year-old female with above medical history and active medical problems as noted below\par \par #1 atrial fibrillation/chronic: Stable labs. Continue rate control. Continue anticoagulation. Watch for bleeding. \par #2 history of nonischemic cardiomyopathy with an single-vessel coronary artery disease. Nonischemic. Iimproved LV systolic function appears stable March 2019.  .Class II functional status. Continue present medications.  Follow-up echocardiogram.\par #3 coronary atherosclerosis. A CCS class 0. On beta blockers be on statin therapy. On chronic anticoagulation. Not on aspirin. Weight reduction recommended. Ejection fraction preserved at present.\par LDL cholesterol is 72. Low dose of atorvastatin. Unable to tolerate higher dosage.\par #4 Hyperglycemia and hypercalcemia.  Thyroid nodule.  Low carbohydrate diet recommended.  Continue follow-up with endocrinologist\par #5 Mild nonobstructive carotid atherosclerosis. Continue lifestyle and her prescription medications.No significant abdominal aortic aneurysm\par #6 thoracic aortic aneurysm.  CT scan 4.2 cm.  TTE 4.5 cm.  2020.  Repeat echocardiogram on a yearly basis.  CT angio of the aorta on periodic basis.\par 7.  Pulmonary nodule.  She was supposed to get a CT scan of the chest.  I do not have it available.  If it is not done she should have repeat CT scan considering history of smoking in the past.\par #8 cold-like symptoms.  Saturation normal.  No fever.  Recommended Covid testing.  Recommended follow-up with your office.\par \par Counseling regarding low saturated fat, salt and carbohydrate intake was reviewed. Active lifestyle and regular. Exercise along with weight management is advised.\par All the above were at length reviewed. Answered all the questions. Thank you very much for this kind referral. Please do not hesitate to give me a call for any question.\par Part of this transcription was done with voice recognition software and phonetically similar errors are common. I apologize for that. Please donot hesitate to call for any questions due to above.\par

## 2021-07-23 NOTE — PHYSICAL EXAM
[Well Developed] : well developed [Well Nourished] : well nourished [No Acute Distress] : no acute distress [Normal Conjunctiva] : normal conjunctiva [Normal Venous Pressure] : normal venous pressure [No Carotid Bruit] : no carotid bruit [No Rub] : no rub [No Gallop] : no gallop [Clear Lung Fields] : clear lung fields [Good Air Entry] : good air entry [No Respiratory Distress] : no respiratory distress  [Soft] : abdomen soft [Non Tender] : non-tender [No Masses/organomegaly] : no masses/organomegaly [Normal Bowel Sounds] : normal bowel sounds [Normal Gait] : normal gait [No Edema] : no edema [No Cyanosis] : no cyanosis [No Clubbing] : no clubbing [No Varicosities] : no varicosities [No Rash] : no rash [No Skin Lesions] : no skin lesions [Moves all extremities] : moves all extremities [No Focal Deficits] : no focal deficits [Normal Speech] : normal speech [Alert and Oriented] : alert and oriented [Normal memory] : normal memory [Obese] : obese [de-identified] : Irregularly irregular.  Rate controlled.  Midsystolic murmur.  2/6 left parasternal region.

## 2021-07-23 NOTE — REASON FOR VISIT
[Symptom and Test Evaluation] : symptom and test evaluation [Arrhythmia/ECG Abnorrmalities] : arrhythmia/ECG abnormalities [Structural Heart and Valve Disease] : structural heart and valve disease [Hyperlipidemia] : hyperlipidemia [Hypertension] : hypertension [FreeTextEntry3] : Dr. mac [FreeTextEntry1] : 70-year-old female is seen in the office for follow-up consultation.\par Since last seen about 6 months ago at present her main complaint is not being able to eat and do things the way she was doing before because of pandemic.  And cold-like symptoms without fever.\par She has been vaccinated according to her with both dosage more than 2 weeks ago.\par She has no chest pain PND orthopnea cough fever or chills\par She does have nasal congestion and sore throat.\par She has no palpitation dizziness lightheadedness\par She has no bleeding complications\par She has been compliant with medication\par No recent hospital admission

## 2021-07-23 NOTE — CARDIOLOGY SUMMARY
[LVEF ___%] : LVEF [unfilled]% [None] : no pulmonary hypertension [Enlarged] : enlarged LA size [Mild] : mild mitral regurgitation [___] : [unfilled] [de-identified] : July 23, 2021.  EKG.  Atrial fibrillation.  Nonspecific ST-T changes [de-identified] : CT angio of the chest for aortic evaluation September 30 8/20/2020.  Atherosclerotic disease of thoracic aorta.  Ascending aorta 4.2 cm abdominal aorta 1.7 cm.  Trace pericardial effusion.  1.6 cm right adrenal nodule.  4 mm nodule of right lower lobe.  Left thyroid nodule

## 2021-08-11 ENCOUNTER — OUTPATIENT (OUTPATIENT)
Dept: OUTPATIENT SERVICES | Facility: HOSPITAL | Age: 71
LOS: 1 days | End: 2021-08-11
Payer: MEDICARE

## 2021-08-11 ENCOUNTER — NON-APPOINTMENT (OUTPATIENT)
Age: 71
End: 2021-08-11

## 2021-08-11 PROCEDURE — 71250 CT THORAX DX C-: CPT | Mod: 26

## 2021-08-27 ENCOUNTER — NON-APPOINTMENT (OUTPATIENT)
Age: 71
End: 2021-08-27

## 2021-09-13 ENCOUNTER — APPOINTMENT (OUTPATIENT)
Dept: CARDIOLOGY | Facility: CLINIC | Age: 71
End: 2021-09-13
Payer: MEDICARE

## 2021-09-13 PROCEDURE — 93306 TTE W/DOPPLER COMPLETE: CPT

## 2021-12-04 ENCOUNTER — RX RENEWAL (OUTPATIENT)
Age: 71
End: 2021-12-04

## 2022-02-08 ENCOUNTER — APPOINTMENT (OUTPATIENT)
Dept: CARDIOLOGY | Facility: CLINIC | Age: 72
End: 2022-02-08
Payer: MEDICARE

## 2022-02-08 VITALS
HEIGHT: 62 IN | DIASTOLIC BLOOD PRESSURE: 62 MMHG | OXYGEN SATURATION: 96 % | SYSTOLIC BLOOD PRESSURE: 110 MMHG | HEART RATE: 79 BPM | WEIGHT: 208 LBS | BODY MASS INDEX: 38.28 KG/M2

## 2022-02-08 DIAGNOSIS — Z01.810 ENCOUNTER FOR PREPROCEDURAL CARDIOVASCULAR EXAMINATION: ICD-10-CM

## 2022-02-08 PROCEDURE — 99215 OFFICE O/P EST HI 40 MIN: CPT

## 2022-02-08 NOTE — REASON FOR VISIT
[Symptom and Test Evaluation] : symptom and test evaluation [Arrhythmia/ECG Abnorrmalities] : arrhythmia/ECG abnormalities [Structural Heart and Valve Disease] : structural heart and valve disease [Hyperlipidemia] : hyperlipidemia [Hypertension] : hypertension [FreeTextEntry3] : Dr. mac [FreeTextEntry1] : 71-year-old female is seen in the office for follow-up consultation.\par Since last seen about 6 months ago at present her main complaint is not being able to eat and do things the way she was doing before because of pandemic.  Also not doing regular exercises.\par She does have increasing dyspnea, leg fatigue when she tries to do things.\par She also has gained weight.\par She has no chest pain PND orthopnea cough fever or chills\par She does have nasal congestion and sore throat.\par She has no palpitation dizziness lightheadedness\par She has no bleeding complications\par She has been compliant with medication\par No recent hospital admission

## 2022-02-08 NOTE — ASSESSMENT
[FreeTextEntry1] : Reviewed on June 12, 2018.\par EKG ordered and interpreted by me on June 12, 2018. Indication atrial fibrillation. Interpretation. Atrial fibrillation. Stable ventricular rate nonspecific ST-T changes.\par \par Reviewed on August 26, 2019\par Labs from August 13, 2019 review IPTH 131\par Labs from August 6, 2019 showed stable. CBC, CMP, with glucose and calcium elevation. LDL 76, total cholesterol 143\par \par reviewed 2/7/20\par ekg: afib/flutter non specifc stt changes\par \par Recent labs reviewed creatinine 0.8 potassium 4.8 sodium 140 GFR normal\par \par Reviewed October 6, 2020.\par CT scan of the chest abdomen and pelvis.  Maximum dimension of ascending aorta 4.2 cm.  Aortic and coronary calcification.  Minimal pericardial effusion noted\par Lung nodule noted about 4 mm.  Right lower lobe\par 1 cm thyroid nodule noted\par 1.4 cm tubular type opacity right lower lobe.\par 1.6 cm right adrenal nodule\par 0.5 cm hypodensity at the tail of the pancreas not characterized well probably representing small sidebranch intraductal papillary mucinous neoplasm\par \par Echocardiogram August 10, 2020 EF 55% mild mitral and aortic regurgitation dilated ascending aorta 4.5 cm RVSP 26 trace circumferential pericardial effusion\par \par Reviewed on December 1, 2020\par MRI October 14, 2020 adrenal gland nodule right 1.6 cm 2 mm pancreatic abnormality\par \par Reviewed on February 8, 2022.\par Labs from August 2021 were reviewed.

## 2022-02-08 NOTE — REVIEW OF SYSTEMS
[Sore Throat] : sore throat [SOB] : shortness of breath [Dyspnea on exertion] : dyspnea during exertion [Negative] : Heme/Lymph [Chest Discomfort] : no chest discomfort [Lower Ext Edema] : no extremity edema [Leg Claudication] : no intermittent leg claudication [Palpitations] : no palpitations [Orthopnea] : no orthopnea [PND] : no PND [Syncope] : no syncope [FreeTextEntry4] : Stuffy nose

## 2022-02-08 NOTE — CARDIOLOGY SUMMARY
[LVEF ___%] : LVEF [unfilled]% [None] : no pulmonary hypertension [Enlarged] : enlarged LA size [Mild] : mild mitral regurgitation [___] : [unfilled] [de-identified] : July 23, 2021.  EKG.  Atrial fibrillation.  Nonspecific ST-T changes [de-identified] : CT angio of the chest for aortic evaluation September 30 8/20/2020.  Atherosclerotic disease of thoracic aorta.  Ascending aorta 4.2 cm abdominal aorta 1.7 cm.  Trace pericardial effusion.  1.6 cm right adrenal nodule.  4 mm nodule of right lower lobe.  Left thyroid nodule

## 2022-02-08 NOTE — DISCUSSION/SUMMARY
[FreeTextEntry1] : 71-year-old female with above medical history and active medical problems as noted below\par \par #1 atrial fibrillation/chronic: Stable labs. Continue rate control. Continue anticoagulation. Watch for bleeding.  High risk medication use no complication so far.  Labs ordered.\par #2 history of nonischemic cardiomyopathy with an single-vessel coronary artery disease. Nonischemic. Iimproved LV systolic function appears stable March 2019.  .Class II functional status. Continue present medications.  Recommended aggressive lifestyle and risk factor modifications.  Labs ordered.  Understands not controlling diet, not doing regular exercise and weight gain would give her exertional symptoms that she is feeling with possible worsening of her cardiovascular illness.\par Based on labs and her symptoms with increasing exercise and diet control will discuss further she needs any new cardiovascular testing or not.\par #3 coronary atherosclerosis. A CCS class 0. On beta blockers be on statin therapy. On chronic anticoagulation. Not on aspirin. Weight reduction recommended. Ejection fraction preserved at present.\par LDL cholesterol is 72. Low dose of atorvastatin. Unable to tolerate higher dosage.\par #4 Hyperglycemia and hypercalcemia.  Thyroid nodule.  Low carbohydrate diet recommended.  Continue follow-up with endocrinologist\par #5 Mild nonobstructive carotid atherosclerosis. Continue lifestyle and her prescription medications.No significant abdominal aortic aneurysm\par #6 thoracic aortic aneurysm.  CT scan 4.2 cm.  TTE 4.5 cm.  2020.  Repeat echocardiogram on a yearly basis.  CT angio of the aorta on periodic basis.\par 7.  Pulmonary nodule.  Stable nodule.  August 11, 2021.  Small solid nodule right lower lobe.  Follow-up with your office\par \par Counseling regarding low saturated fat, salt and carbohydrate intake was reviewed. Active lifestyle and regular. Exercise along with weight management is advised.\par All the above were at length reviewed. Answered all the questions. Thank you very much for this kind referral. Please do not hesitate to give me a call for any question.\par Part of this transcription was done with voice recognition software and phonetically similar errors are common. I apologize for that. Please donot hesitate to call for any questions due to above.\par

## 2022-02-08 NOTE — PHYSICAL EXAM
[Well Developed] : well developed [Well Nourished] : well nourished [No Acute Distress] : no acute distress [Obese] : obese [Normal Conjunctiva] : normal conjunctiva [Normal Venous Pressure] : normal venous pressure [No Carotid Bruit] : no carotid bruit [No Rub] : no rub [No Gallop] : no gallop [Clear Lung Fields] : clear lung fields [Good Air Entry] : good air entry [No Respiratory Distress] : no respiratory distress  [Normal Gait] : normal gait [No Edema] : no edema [No Cyanosis] : no cyanosis [No Clubbing] : no clubbing [No Varicosities] : no varicosities [No Rash] : no rash [Moves all extremities] : moves all extremities [Normal Speech] : normal speech [Alert and Oriented] : alert and oriented [Normal Radial B/L] : normal radial B/L [Normal DP B/L] : normal DP B/L [de-identified] : Irregularly irregular.  Rate controlled.  Midsystolic murmur.  2/6 left parasternal region.

## 2022-08-09 ENCOUNTER — APPOINTMENT (OUTPATIENT)
Dept: CARDIOLOGY | Facility: CLINIC | Age: 72
End: 2022-08-09

## 2022-08-09 ENCOUNTER — NON-APPOINTMENT (OUTPATIENT)
Age: 72
End: 2022-08-09

## 2022-08-09 VITALS
OXYGEN SATURATION: 98 % | SYSTOLIC BLOOD PRESSURE: 104 MMHG | DIASTOLIC BLOOD PRESSURE: 78 MMHG | WEIGHT: 207 LBS | HEIGHT: 62 IN | BODY MASS INDEX: 38.09 KG/M2 | TEMPERATURE: 97.8 F | HEART RATE: 88 BPM

## 2022-08-09 PROCEDURE — 99214 OFFICE O/P EST MOD 30 MIN: CPT

## 2022-08-09 PROCEDURE — 93000 ELECTROCARDIOGRAM COMPLETE: CPT

## 2022-08-09 NOTE — REVIEW OF SYSTEMS
[SOB] : shortness of breath [Dyspnea on exertion] : dyspnea during exertion [Sore Throat] : no sore throat [Chest Discomfort] : no chest discomfort [Lower Ext Edema] : no extremity edema [Leg Claudication] : no intermittent leg claudication [Palpitations] : no palpitations [Orthopnea] : no orthopnea [PND] : no PND [Syncope] : no syncope [Negative] : ENT

## 2022-08-09 NOTE — CARDIOLOGY SUMMARY
[LVEF ___%] : LVEF [unfilled]% [None] : no pulmonary hypertension [Enlarged] : enlarged LA size [Mild] : mild mitral regurgitation [___] : [unfilled] [de-identified] : July 23, 2021.  EKG.  Atrial fibrillation.  Nonspecific ST-T changes\par August 9, 2022.  Atrial fibrillation.  Nonspecific ST-T changes. [de-identified] : CT angio of the chest for aortic evaluation September 30 8/20/2020.  Atherosclerotic disease of thoracic aorta.  Ascending aorta 4.2 cm abdominal aorta 1.7 cm.  Trace pericardial effusion.  1.6 cm right adrenal nodule.  4 mm nodule of right lower lobe.  Left thyroid nodule

## 2022-08-09 NOTE — DISCUSSION/SUMMARY
[FreeTextEntry1] : 71-year-old female with above medical history and active medical problems as noted below\par \par #1 atrial fibrillation/chronic: Stable labs. Continue rate control. Continue anticoagulation. Watch for bleeding.  High risk medication use no complication so far.  Labs ordered.\par #2 history of nonischemic cardiomyopathy with an single-vessel coronary artery disease. Nonischemic. Iimproved LV systolic function appears stable March 2019.  .Class II functional status. Continue present medications.  Recommended aggressive lifestyle and risk factor modifications.  Labs ordered.  Need to lose weight.\par #3 coronary atherosclerosis. A CCS class 0. On beta blockers be on statin therapy. On chronic anticoagulation. Not on aspirin. Weight reduction recommended. Ejection fraction preserved at present.\par LDL cholesterol is 72. Low dose of atorvastatin. Unable to tolerate higher dosage.\par #4 Hyperglycemia and hypercalcemia.  Thyroid nodule.  Low carbohydrate diet recommended.  Continue follow-up with endocrinologist\par #5 Mild nonobstructive carotid atherosclerosis. Continue lifestyle and her prescription medications.No significant abdominal aortic aneurysm\par #6 thoracic aortic aneurysm.  CT scan 4.2 cm.  TTE 4.5 cm.  2020.  Repeat echocardiogram on a yearly basis.  CT angio of the aorta on periodic basis.\par 7.  Pulmonary nodule.  Stable nodule.  August 11, 2021.  Small solid nodule right lower lobe.  Follow-up with your office\par \par Counseling regarding low saturated fat, salt and carbohydrate intake was reviewed. Active lifestyle and regular. Exercise along with weight management is advised.\par All the above were at length reviewed. Answered all the questions. Thank you very much for this kind referral. Please do not hesitate to give me a call for any question.\par Part of this transcription was done with voice recognition software and phonetically similar errors are common. I apologize for that. Please donot hesitate to call for any questions due to above.\par \par Sincerely,\par Jony Callejas MD,FACC,JESS\par

## 2022-08-09 NOTE — REASON FOR VISIT
[Symptom and Test Evaluation] : symptom and test evaluation [Arrhythmia/ECG Abnorrmalities] : arrhythmia/ECG abnormalities [Structural Heart and Valve Disease] : structural heart and valve disease [Hyperlipidemia] : hyperlipidemia [Hypertension] : hypertension [FreeTextEntry3] : Dr. mac [FreeTextEntry1] : 71-year-old female comes in for follow-up consultation.  Since last seen she has gained about 10 pounds.  She has mild increased dyspnea.  No PND orthopnea.  Occasional ankle edema.  \par She has no chest pain PND orthopnea cough fever or chills\par No significant good activity or exercise\par She has no palpitation dizziness lightheadedness\par She has no bleeding complications\par She has been compliant with medication\par No recent hospital admission

## 2022-08-09 NOTE — PHYSICAL EXAM
[Well Developed] : well developed [Well Nourished] : well nourished [No Acute Distress] : no acute distress [Obese] : obese [Normal Venous Pressure] : normal venous pressure [No Carotid Bruit] : no carotid bruit [No Rub] : no rub [No Gallop] : no gallop [Clear Lung Fields] : clear lung fields [Good Air Entry] : good air entry [No Respiratory Distress] : no respiratory distress  [Normal Gait] : normal gait [No Edema] : no edema [No Cyanosis] : no cyanosis [No Clubbing] : no clubbing [No Varicosities] : no varicosities [Normal Radial B/L] : normal radial B/L [Normal DP B/L] : normal DP B/L [Moves all extremities] : moves all extremities [Normal Speech] : normal speech [Alert and Oriented] : alert and oriented [de-identified] : Irregularly irregular.  Rate controlled.  Midsystolic murmur.  2/6 left parasternal region.

## 2022-08-09 NOTE — ASSESSMENT
[FreeTextEntry1] : Reviewed on June 12, 2018.\par EKG ordered and interpreted by me on June 12, 2018. Indication atrial fibrillation. Interpretation. Atrial fibrillation. Stable ventricular rate nonspecific ST-T changes.\par \par Reviewed on August 26, 2019\par Labs from August 13, 2019 review IPTH 131\par Labs from August 6, 2019 showed stable. CBC, CMP, with glucose and calcium elevation. LDL 76, total cholesterol 143\par \par reviewed 2/7/20\par ekg: afib/flutter non specifc stt changes\par \par Recent labs reviewed creatinine 0.8 potassium 4.8 sodium 140 GFR normal\par \par Reviewed October 6, 2020.\par CT scan of the chest abdomen and pelvis.  Maximum dimension of ascending aorta 4.2 cm.  Aortic and coronary calcification.  Minimal pericardial effusion noted\par Lung nodule noted about 4 mm.  Right lower lobe\par 1 cm thyroid nodule noted\par 1.4 cm tubular type opacity right lower lobe.\par 1.6 cm right adrenal nodule\par 0.5 cm hypodensity at the tail of the pancreas not characterized well probably representing small sidebranch intraductal papillary mucinous neoplasm\par \par Echocardiogram August 10, 2020 EF 55% mild mitral and aortic regurgitation dilated ascending aorta 4.5 cm RVSP 26 trace circumferential pericardial effusion\par \par Reviewed on December 1, 2020\par MRI October 14, 2020 adrenal gland nodule right 1.6 cm 2 mm pancreatic abnormality\par \par Reviewed on February 8, 2022.\par Labs from August 2021 were reviewed.\par \par Reviewed on August 9, 2022\par Labs from February 21, 2022 normal CBC creatinine 0.84 potassium 4.7 sodium 141 LFT normal N-terminal proBNP 640\par EKG as noted above\par

## 2022-09-13 ENCOUNTER — APPOINTMENT (OUTPATIENT)
Dept: CARDIOLOGY | Facility: CLINIC | Age: 72
End: 2022-09-13

## 2022-09-13 PROCEDURE — 93306 TTE W/DOPPLER COMPLETE: CPT

## 2022-10-20 ENCOUNTER — RX RENEWAL (OUTPATIENT)
Age: 72
End: 2022-10-20

## 2022-12-01 ENCOUNTER — RX RENEWAL (OUTPATIENT)
Age: 72
End: 2022-12-01

## 2023-02-20 ENCOUNTER — OFFICE (OUTPATIENT)
Dept: URBAN - METROPOLITAN AREA CLINIC 8 | Facility: CLINIC | Age: 73
Setting detail: OPHTHALMOLOGY
End: 2023-02-20
Payer: MEDICARE

## 2023-02-20 DIAGNOSIS — H02.831: ICD-10-CM

## 2023-02-20 DIAGNOSIS — H02.834: ICD-10-CM

## 2023-02-20 DIAGNOSIS — E11.9: ICD-10-CM

## 2023-02-20 DIAGNOSIS — H50.10: ICD-10-CM

## 2023-02-20 DIAGNOSIS — H25.13: ICD-10-CM

## 2023-02-20 PROCEDURE — 92014 COMPRE OPH EXAM EST PT 1/>: CPT | Performed by: OPHTHALMOLOGY

## 2023-02-20 ASSESSMENT — REFRACTION_CURRENTRX
OS_OVR_VA: 20/
OD_OVR_VA: 20/
OD_VPRISM_DIRECTION: SV
OD_CYLINDER: SPHERE
OD_SPHERE: +2.00
OS_SPHERE: +2.00
OS_VPRISM_DIRECTION: SV
OS_CYLINDER: SPHERE

## 2023-02-20 ASSESSMENT — REFRACTION_MANIFEST
OS_CYLINDER: -0.75
OS_SPHERE: +0.75
OD_ADD: +1.75
OS_CYLINDER: -0.75
OD_SPHERE: +1.25
OS_AXIS: 075
OS_ADD: +2.25
OS_AXIS: 075
OS_SPHERE: +0.75
OD_SPHERE: +1.25
OD_AXIS: 110
OD_CYLINDER: -0.75
OS_VA1: 20/20
OD_AXIS: 105
OS_VA2: 20/20(J1+)
OS_ADD: +1.75
OD_CYLINDER: -0.75
OD_VA2: 20/20(J1+)
OS_VA1: 20/40-
OD_ADD: +2.25
OU_VA: 20/40
OD_VA1: 20/40-2
OD_VA1: 20/20

## 2023-02-20 ASSESSMENT — KERATOMETRY
OS_K2POWER_DIOPTERS: 44.00
OS_AXISANGLE_DEGREES: 168
OD_K1POWER_DIOPTERS: 43.25
OD_K2POWER_DIOPTERS: 46.50
OS_K1POWER_DIOPTERS: 43.25
OD_AXISANGLE_DEGREES: 056

## 2023-02-20 ASSESSMENT — AXIALLENGTH_DERIVED
OD_AL: 22.5505
OS_AL: 23.4014
OD_AL: 22.7752
OS_AL: 23.1174
OS_AL: 23.4014
OD_AL: 22.7752

## 2023-02-20 ASSESSMENT — SPHEQUIV_DERIVED
OS_SPHEQUIV: 0.375
OD_SPHEQUIV: 1.5
OS_SPHEQUIV: 0.375
OD_SPHEQUIV: 0.875
OS_SPHEQUIV: 1.125
OD_SPHEQUIV: 0.875

## 2023-02-20 ASSESSMENT — REFRACTION_AUTOREFRACTION
OS_SPHERE: +2.00
OD_CYLINDER: -1.00
OS_AXIS: 074
OD_AXIS: 108
OS_CYLINDER: -1.75
OD_SPHERE: +2.00

## 2023-02-20 ASSESSMENT — CONFRONTATIONAL VISUAL FIELD TEST (CVF)
OS_FINDINGS: FULL
OD_FINDINGS: FULL

## 2023-02-20 ASSESSMENT — LID POSITION - DERMATOCHALASIS
OS_DERMATOCHALASIS: 2+ 3+
OD_DERMATOCHALASIS: 2+ 3+

## 2023-02-20 ASSESSMENT — VISUAL ACUITY
OD_BCVA: 20/30-
OS_BCVA: 20/30-2

## 2023-03-03 ENCOUNTER — TRANSCRIPTION ENCOUNTER (OUTPATIENT)
Age: 73
End: 2023-03-03

## 2023-03-21 ENCOUNTER — APPOINTMENT (OUTPATIENT)
Dept: CARDIOLOGY | Facility: CLINIC | Age: 73
End: 2023-03-21
Payer: MEDICARE

## 2023-03-21 VITALS
BODY MASS INDEX: 38.28 KG/M2 | WEIGHT: 208 LBS | SYSTOLIC BLOOD PRESSURE: 110 MMHG | HEIGHT: 62 IN | HEART RATE: 84 BPM | OXYGEN SATURATION: 93 % | DIASTOLIC BLOOD PRESSURE: 60 MMHG

## 2023-03-21 DIAGNOSIS — I35.1 NONRHEUMATIC AORTIC (VALVE) INSUFFICIENCY: ICD-10-CM

## 2023-03-21 PROCEDURE — 99214 OFFICE O/P EST MOD 30 MIN: CPT

## 2023-03-21 RX ORDER — METFORMIN ER 500 MG 500 MG/1
500 TABLET ORAL DAILY
Refills: 0 | Status: ACTIVE | COMMUNITY

## 2023-03-21 NOTE — CARDIOLOGY SUMMARY
[LVEF ___%] : LVEF [unfilled]% [None] : no pulmonary hypertension [Enlarged] : enlarged LA size [Mild] : mild mitral regurgitation [___] : [unfilled] [de-identified] : July 23, 2021.  EKG.  Atrial fibrillation.  Nonspecific ST-T changes\par August 9, 2022.  Atrial fibrillation.  Nonspecific ST-T changes. [de-identified] : September 13, 2023.  EF 55 to 60% mild to moderate mitral regurgitation mild aortic regurgitation ascending aorta 3.9 cm.  Severely dilated left atrium.  Right atrial enlargement.  Pulmonary artery systolic pressure 39 mmHg. [de-identified] : CT angio of the chest for aortic evaluation September 30 8/20/2020.  Atherosclerotic disease of thoracic aorta.  Ascending aorta 4.2 cm abdominal aorta 1.7 cm.  Trace pericardial effusion.  1.6 cm right adrenal nodule.  4 mm nodule of right lower lobe.  Left thyroid nodule

## 2023-03-21 NOTE — REVIEW OF SYSTEMS
[SOB] : shortness of breath [Dyspnea on exertion] : dyspnea during exertion [Negative] : Heme/Lymph [Sore Throat] : no sore throat [Chest Discomfort] : no chest discomfort [Lower Ext Edema] : no extremity edema [Leg Claudication] : no intermittent leg claudication [Palpitations] : no palpitations [Orthopnea] : no orthopnea [PND] : no PND [Syncope] : no syncope

## 2023-03-21 NOTE — REASON FOR VISIT
[Symptom and Test Evaluation] : symptom and test evaluation [Arrhythmia/ECG Abnorrmalities] : arrhythmia/ECG abnormalities [Structural Heart and Valve Disease] : structural heart and valve disease [Hyperlipidemia] : hyperlipidemia [Hypertension] : hypertension [FreeTextEntry3] : Dr. mac [FreeTextEntry1] : 72-year old is seen in the office for follow-up consultation.  Since last seen she has been more aggressive with her diet.  But not with activity and exercise.  Able to lose weight.\par She has been seeing endocrinologist.  Has been started on metformin.\par She denies any worsening of exertional  dyspnea.  No PND orthopnea.  Occasional ankle edema.  \par She has no chest pain PND orthopnea cough fever or chills\par No significant good activity or exercise\par She has no palpitation dizziness lightheadedness\par She has no bleeding complications\par She has been compliant with medication\par No recent hospital admission

## 2023-03-21 NOTE — PHYSICAL EXAM
[Well Developed] : well developed [Well Nourished] : well nourished [No Acute Distress] : no acute distress [Obese] : obese [Normal Venous Pressure] : normal venous pressure [No Carotid Bruit] : no carotid bruit [No Rub] : no rub [Clear Lung Fields] : clear lung fields [No Gallop] : no gallop [Good Air Entry] : good air entry [No Respiratory Distress] : no respiratory distress  [Normal Gait] : normal gait [No Edema] : no edema [No Cyanosis] : no cyanosis [No Clubbing] : no clubbing [No Varicosities] : no varicosities [Normal Radial B/L] : normal radial B/L [Moves all extremities] : moves all extremities [Normal Speech] : normal speech [Alert and Oriented] : alert and oriented [de-identified] : Irregularly irregular.  Rate controlled.  Midsystolic murmur.  2/6 left parasternal region.

## 2023-03-21 NOTE — DISCUSSION/SUMMARY
[FreeTextEntry1] : 72-year-old female with above medical history and active medical problems as noted below\par \par #1 atrial fibrillation/chronic: Stable labs. Continue rate control. Continue anticoagulation. Watch for bleeding.  High risk medication use no complication so far.  Labs ordered.\par #2 history of nonischemic cardiomyopathy with an single-vessel coronary artery disease. Nonischemic. Iimproved LV systolic function appears stable 9/2023.  .Class II functional status. Continue present medications.  Recommended aggressive lifestyle and risk factor modifications.  Labs ordered.  Need to lose weight.\par #3 coronary atherosclerosis. A CCS class 0. On beta blockers be on statin therapy. On chronic anticoagulation. Not on aspirin. Weight reduction recommended. Ejection fraction preserved at present.\par LDL cholesterol is 72. Low dose of atorvastatin. Unable to tolerate higher dosage.\par #4 Hyperglycemia and hypercalcemia.  Thyroid nodule.  Low carbohydrate diet recommended.  Continue follow-up with endocrinologist\par #5 Mild nonobstructive carotid atherosclerosis. Continue lifestyle and her prescription medications.No significant abdominal aortic aneurysm\par #6 thoracic aortic aneurysm.  CT scan 4.2 cm.  TTE 3.9 cm 2022.  Repeat echocardiogram on a yearly basis.  CT angio of the aorta on periodic basis.\par 7.  Pulmonary nodule.  Stable nodule.  August 11, 2021.  Small solid nodule right lower lobe.  Follow-up with your office\par \par Counseling regarding low saturated fat, salt and carbohydrate intake was reviewed. Active lifestyle and regular. Exercise along with weight management is advised.\par All the above were at length reviewed. Answered all the questions. Thank you very much for this kind referral. Please do not hesitate to give me a call for any question.\par Part of this transcription was done with voice recognition software and phonetically similar errors are common. I apologize for that. Please donot hesitate to call for any questions due to above.\par \par Sincerely,\par Jony Callejas MD,FACC,JESS\par

## 2023-03-21 NOTE — ASSESSMENT
[FreeTextEntry1] : Reviewed on June 12, 2018.\par EKG ordered and interpreted by me on June 12, 2018. Indication atrial fibrillation. Interpretation. Atrial fibrillation. Stable ventricular rate nonspecific ST-T changes.\par \par Reviewed on August 26, 2019\par Labs from August 13, 2019 review IPTH 131\par Labs from August 6, 2019 showed stable. CBC, CMP, with glucose and calcium elevation. LDL 76, total cholesterol 143\par \par reviewed 2/7/20\par ekg: afib/flutter non specifc stt changes\par \par Recent labs reviewed creatinine 0.8 potassium 4.8 sodium 140 GFR normal\par \par Reviewed October 6, 2020.\par CT scan of the chest abdomen and pelvis.  Maximum dimension of ascending aorta 4.2 cm.  Aortic and coronary calcification.  Minimal pericardial effusion noted\par Lung nodule noted about 4 mm.  Right lower lobe\par 1 cm thyroid nodule noted\par 1.4 cm tubular type opacity right lower lobe.\par 1.6 cm right adrenal nodule\par 0.5 cm hypodensity at the tail of the pancreas not characterized well probably representing small sidebranch intraductal papillary mucinous neoplasm\par \par Echocardiogram August 10, 2020 EF 55% mild mitral and aortic regurgitation dilated ascending aorta 4.5 cm RVSP 26 trace circumferential pericardial effusion\par \par Reviewed on December 1, 2020\par MRI October 14, 2020 adrenal gland nodule right 1.6 cm 2 mm pancreatic abnormality\par \par Reviewed on February 8, 2022.\par Labs from August 2021 were reviewed.\par \par Reviewed on August 9, 2022\par Labs from February 21, 2022 normal CBC creatinine 0.84 potassium 4.7 sodium 141 LFT normal N-terminal proBNP 640\par EKG as noted above\par \par Reviewed on March 21, 2023.\par Recent urinalysis with metanephrine level which were negative.\par

## 2023-04-20 ENCOUNTER — RX RENEWAL (OUTPATIENT)
Age: 73
End: 2023-04-20

## 2023-06-16 ENCOUNTER — RX RENEWAL (OUTPATIENT)
Age: 73
End: 2023-06-16

## 2023-09-26 ENCOUNTER — RX RENEWAL (OUTPATIENT)
Age: 73
End: 2023-09-26

## 2023-09-26 RX ORDER — CARVEDILOL 12.5 MG/1
12.5 TABLET, FILM COATED ORAL
Qty: 270 | Refills: 3 | Status: ACTIVE | COMMUNITY
Start: 2018-12-17 | End: 1900-01-01

## 2023-09-27 ENCOUNTER — APPOINTMENT (OUTPATIENT)
Dept: CT IMAGING | Age: 73
End: 2023-09-27
Attending: EMERGENCY MEDICINE

## 2023-09-27 ENCOUNTER — APPOINTMENT (OUTPATIENT)
Dept: GENERAL RADIOLOGY | Age: 73
End: 2023-09-27
Attending: EMERGENCY MEDICINE

## 2023-09-27 ENCOUNTER — HOSPITAL ENCOUNTER (EMERGENCY)
Age: 73
Discharge: HOME | End: 2023-09-27
Attending: EMERGENCY MEDICINE

## 2023-09-27 VITALS
HEART RATE: 70 BPM | TEMPERATURE: 98.1 F | OXYGEN SATURATION: 99 % | RESPIRATION RATE: 18 BRPM | DIASTOLIC BLOOD PRESSURE: 70 MMHG | SYSTOLIC BLOOD PRESSURE: 106 MMHG

## 2023-09-27 DIAGNOSIS — S42.436A: Primary | ICD-10-CM

## 2023-09-27 PROCEDURE — 73030 X-RAY EXAM OF SHOULDER: CPT

## 2023-09-27 PROCEDURE — 99284 EMERGENCY DEPT VISIT MOD MDM: CPT | Performed by: EMERGENCY MEDICINE

## 2023-09-27 PROCEDURE — 70450 CT HEAD/BRAIN W/O DYE: CPT

## 2023-09-27 PROCEDURE — 73562 X-RAY EXAM OF KNEE 3: CPT

## 2023-09-27 PROCEDURE — 99283 EMERGENCY DEPT VISIT LOW MDM: CPT

## 2023-09-27 PROCEDURE — G1004 CDSM NDSC: HCPCS

## 2023-09-27 PROCEDURE — 10002803 HB RX 637

## 2023-09-27 PROCEDURE — 10004651 HB RX, NO CHARGE ITEM

## 2023-09-27 RX ORDER — IBUPROFEN 600 MG/1
TABLET ORAL
Status: COMPLETED
Start: 2023-09-27 | End: 2023-09-27

## 2023-09-27 RX ORDER — KETOROLAC TROMETHAMINE 30 MG/ML
15 INJECTION, SOLUTION INTRAMUSCULAR; INTRAVENOUS ONCE
Status: DISCONTINUED | OUTPATIENT
Start: 2023-09-27 | End: 2023-09-27

## 2023-09-27 RX ORDER — LIDOCAINE 4 G/G
2 PATCH TOPICAL ONCE
Status: DISCONTINUED | OUTPATIENT
Start: 2023-09-27 | End: 2023-09-28 | Stop reason: HOSPADM

## 2023-09-27 RX ORDER — ACETAMINOPHEN 500 MG
1000 TABLET ORAL ONCE
Status: COMPLETED | OUTPATIENT
Start: 2023-09-27 | End: 2023-09-27

## 2023-09-27 RX ADMIN — ACETAMINOPHEN 1000 MG: 500 TABLET ORAL at 22:22

## 2023-09-27 RX ADMIN — LIDOCAINE 2 PATCH: 4 PATCH TOPICAL at 22:26

## 2023-09-27 RX ADMIN — IBUPROFEN 600 MG: 600 TABLET, FILM COATED ORAL at 18:06

## 2023-09-27 ASSESSMENT — PAIN SCALES - GENERAL: PAINLEVEL_OUTOF10: 10

## 2023-10-10 ENCOUNTER — NON-APPOINTMENT (OUTPATIENT)
Age: 73
End: 2023-10-10

## 2023-10-10 ENCOUNTER — APPOINTMENT (OUTPATIENT)
Dept: CARDIOLOGY | Facility: CLINIC | Age: 73
End: 2023-10-10
Payer: MEDICARE

## 2023-10-10 VITALS
HEART RATE: 86 BPM | OXYGEN SATURATION: 99 % | WEIGHT: 204 LBS | SYSTOLIC BLOOD PRESSURE: 108 MMHG | HEIGHT: 62 IN | BODY MASS INDEX: 37.54 KG/M2 | DIASTOLIC BLOOD PRESSURE: 70 MMHG

## 2023-10-10 DIAGNOSIS — I34.0 NONRHEUMATIC MITRAL (VALVE) INSUFFICIENCY: ICD-10-CM

## 2023-10-10 PROCEDURE — 93000 ELECTROCARDIOGRAM COMPLETE: CPT

## 2023-10-10 PROCEDURE — 99214 OFFICE O/P EST MOD 30 MIN: CPT

## 2023-10-10 PROCEDURE — 93306 TTE W/DOPPLER COMPLETE: CPT

## 2023-12-09 ENCOUNTER — RX RENEWAL (OUTPATIENT)
Age: 73
End: 2023-12-09

## 2023-12-09 RX ORDER — FUROSEMIDE 20 MG/1
20 TABLET ORAL DAILY
Qty: 90 | Refills: 1 | Status: ACTIVE | COMMUNITY
Start: 2020-09-08 | End: 1900-01-01

## 2023-12-20 ENCOUNTER — RX RENEWAL (OUTPATIENT)
Age: 73
End: 2023-12-20

## 2023-12-20 RX ORDER — RAMIPRIL 10 MG/1
10 CAPSULE ORAL
Qty: 90 | Refills: 3 | Status: ACTIVE | COMMUNITY
Start: 2019-07-20 | End: 1900-01-01

## 2024-02-28 ENCOUNTER — OFFICE (OUTPATIENT)
Dept: URBAN - METROPOLITAN AREA CLINIC 8 | Facility: CLINIC | Age: 74
Setting detail: OPHTHALMOLOGY
End: 2024-02-28
Payer: MEDICARE

## 2024-02-28 DIAGNOSIS — H02.831: ICD-10-CM

## 2024-02-28 DIAGNOSIS — H02.834: ICD-10-CM

## 2024-02-28 DIAGNOSIS — E11.9: ICD-10-CM

## 2024-02-28 DIAGNOSIS — H16.223: ICD-10-CM

## 2024-02-28 DIAGNOSIS — H25.13: ICD-10-CM

## 2024-02-28 DIAGNOSIS — H50.10: ICD-10-CM

## 2024-02-28 PROCEDURE — 92014 COMPRE OPH EXAM EST PT 1/>: CPT | Performed by: OPHTHALMOLOGY

## 2024-02-28 ASSESSMENT — REFRACTION_AUTOREFRACTION
OS_CYLINDER: -2.00
OS_AXIS: 078
OD_AXIS: 100
OS_SPHERE: +2.25
OD_SPHERE: +2.50
OD_CYLINDER: -1.75

## 2024-02-28 ASSESSMENT — REFRACTION_CURRENTRX
OS_VPRISM_DIRECTION: SV
OD_OVR_VA: 20/
OD_CYLINDER: SPHERE
OS_CYLINDER: SPHERE
OD_VPRISM_DIRECTION: SV
OS_SPHERE: +2.50
OS_OVR_VA: 20/
OD_SPHERE: +2.50

## 2024-02-28 ASSESSMENT — REFRACTION_MANIFEST
OS_VA2: 20/20(J1+)
OS_SPHERE: +0.75
OS_AXIS: 080
OD_ADD: +2.50
OD_SPHERE: +1.00
OD_VA1: 20/25-2
OS_ADD: +1.75
OS_SPHERE: +1.00
OS_ADD: +2.50
OD_VA2: 20/20(J1+)
OS_AXIS: 075
OS_CYLINDER: -1.00
OD_AXIS: 100
OD_CYLINDER: -0.75
OD_AXIS: 105
OS_CYLINDER: -0.75
OU_VA: 20/25+3
OD_VA1: 20/20
OS_VA1: 20/25
OD_CYLINDER: -1.00
OD_ADD: +1.75
OD_SPHERE: +1.25
OS_VA1: 20/20

## 2024-02-28 ASSESSMENT — SUPERFICIAL PUNCTATE KERATITIS (SPK)
OD_SPK: T
OS_SPK: T

## 2024-02-28 ASSESSMENT — CONFRONTATIONAL VISUAL FIELD TEST (CVF)
OD_FINDINGS: FULL
OS_FINDINGS: FULL

## 2024-02-28 ASSESSMENT — SPHEQUIV_DERIVED
OS_SPHEQUIV: 0.5
OD_SPHEQUIV: 1.625
OS_SPHEQUIV: 0.375
OS_SPHEQUIV: 1.25
OD_SPHEQUIV: 0.875
OD_SPHEQUIV: 0.5

## 2024-02-28 ASSESSMENT — TEAR BREAK UP TIME (TBUT)
OD_TBUT: 8 SECS
OS_TBUT: 8 SECS

## 2024-02-28 ASSESSMENT — LID POSITION - DERMATOCHALASIS
OS_DERMATOCHALASIS: LUL 2+
OD_DERMATOCHALASIS: RUL 2+

## 2024-02-29 ENCOUNTER — APPOINTMENT (OUTPATIENT)
Dept: ULTRASOUND IMAGING | Facility: CLINIC | Age: 74
End: 2024-02-29

## 2024-02-29 ENCOUNTER — APPOINTMENT (OUTPATIENT)
Dept: MAMMOGRAPHY | Facility: CLINIC | Age: 74
End: 2024-02-29
Payer: MEDICARE

## 2024-02-29 PROCEDURE — 77063 BREAST TOMOSYNTHESIS BI: CPT

## 2024-02-29 PROCEDURE — 76536 US EXAM OF HEAD AND NECK: CPT

## 2024-02-29 PROCEDURE — 77067 SCR MAMMO BI INCL CAD: CPT

## 2024-04-09 ENCOUNTER — APPOINTMENT (OUTPATIENT)
Dept: CARDIOLOGY | Facility: CLINIC | Age: 74
End: 2024-04-09
Payer: MEDICARE

## 2024-04-09 VITALS
HEART RATE: 85 BPM | SYSTOLIC BLOOD PRESSURE: 92 MMHG | DIASTOLIC BLOOD PRESSURE: 56 MMHG | BODY MASS INDEX: 37.73 KG/M2 | WEIGHT: 205 LBS | HEIGHT: 62 IN | OXYGEN SATURATION: 100 %

## 2024-04-09 DIAGNOSIS — I25.10 ATHEROSCLEROTIC HEART DISEASE OF NATIVE CORONARY ARTERY W/OUT ANGINA PECTORIS: ICD-10-CM

## 2024-04-09 DIAGNOSIS — Z79.01 LONG TERM (CURRENT) USE OF ANTICOAGULANTS: ICD-10-CM

## 2024-04-09 DIAGNOSIS — E78.2 MIXED HYPERLIPIDEMIA: ICD-10-CM

## 2024-04-09 DIAGNOSIS — I71.20 THORACIC AORTIC ANEURYSM, WITHOUT RUPTURE, UNSPECIFIED: ICD-10-CM

## 2024-04-09 DIAGNOSIS — I42.8 OTHER CARDIOMYOPATHIES: ICD-10-CM

## 2024-04-09 DIAGNOSIS — I48.20 CHRONIC ATRIAL FIBRILLATION, UNSP: ICD-10-CM

## 2024-04-09 DIAGNOSIS — I10 ESSENTIAL (PRIMARY) HYPERTENSION: ICD-10-CM

## 2024-04-09 PROCEDURE — G2211 COMPLEX E/M VISIT ADD ON: CPT

## 2024-04-09 PROCEDURE — 99214 OFFICE O/P EST MOD 30 MIN: CPT

## 2024-04-09 NOTE — HISTORY OF PRESENT ILLNESS
[FreeTextEntry1] : \par  Her other active medical problems as noted below.\par  \par  Atrial Fibrillation: persistent rate controlled. On anticoagulation\par  \par  Thoracic aortic aneurysm.  4.2 cm on recent CT scan September 30, 2020\par  \par  -Coronary artery disease, one vessel, small, not intervenable candidate. Continue risk factor modification. No chest pain.CCS class is 0.  On anticoagulation.  Not on aspirin due to anticoagulation and risk of bleeding.  On beta blockers.  On statin therapy.  No active smoking. Former smoker\par  \par  -Cardiomyopathy, idiopathic, on maximized dose of ACE inhibitor and beta-blockers, improved LVEF.Class 1-2.  Nonsmoker.  No significant alcohol intake.  LV ejection fraction normalized.\par  \par  -Dyslipidemia, stable on statin therapy and lifestyle modifications.\par  \par  -abnormal glucose . prediabetes\par  \par  Hyperparathyroidism with hypercalcemia\par  Thyroid nodule\par

## 2024-04-09 NOTE — DISCUSSION/SUMMARY
[FreeTextEntry1] : 73-year-old female with above medical history and active medical problems as noted below  #1 atrial fibrillation/chronic: Stable labs. Continue rate control. Continue anticoagulation. Watch for bleeding.  High risk medication use no complication so far.  Labs ordered. #2 history of nonischemic cardiomyopathy with an single-vessel coronary artery disease. Nonischemic. Iimproved LV systolic function appears stable 10/2023.  .Class II functional status. Continue present medications.  Recommended aggressive lifestyle and risk factor modifications.  Labs ordered.  Need to lose weight.  Mildly elevated N-terminal proBNP.  I have reviewed role of SGLT2 inhibitors.  Will discuss with endocrinologist and consider changing metformin to SGLT2 inhibitors for long-term cardiovascular benefit.  Risk benefits alternatives side effects reviewed.  Increase hydration will be required.  And probably no use of as needed furosemide that she has been using at present. #3 coronary atherosclerosis. A CCS class 0. On beta blockers be on statin therapy. On chronic anticoagulation. Not on aspirin. Weight reduction recommended. Ejection fraction preserved at present. LDL cholesterol is 72. Low dose of atorvastatin. Unable to tolerate higher dosage. #4 Hyperglycemia and hypercalcemia.  Thyroid nodule.  Low carbohydrate diet recommended.  Continue follow-up with endocrinologist #5 Mild nonobstructive carotid atherosclerosis. Continue lifestyle and her prescription medications.No significant abdominal aortic aneurysm #6 thoracic aortic aneurysm.  CT scan 4.2 cm.  TTE 3.9 cm 2023.  Repeat echocardiogram on a yearly basis.  CT angio of the aorta on periodic basis. 7.  Pulmonary nodule.  Stable nodule.  August 11, 2021.  Small solid nodule right lower lobe.  Follow-up with your office  Counseling regarding low saturated fat, salt and carbohydrate intake was reviewed. Active lifestyle and regular. Exercise along with weight management is advised. All the above were at length reviewed. Answered all the questions. Thank you very much for this kind referral. Please do not hesitate to give me a call for any question. Part of this transcription was done with voice recognition software and phonetically similar errors are common. I apologize for that. Please donot hesitate to call for any questions due to above.  Sincerely, Jony Callejas MD,[facility],JESS

## 2024-04-09 NOTE — CARDIOLOGY SUMMARY
[LVEF ___%] : LVEF [unfilled]% [None] : no pulmonary hypertension [Enlarged] : enlarged LA size [Mild] : mild mitral regurgitation [___] : [unfilled] [de-identified] : September 13, 2023.  EF 55 to 60% mild to moderate mitral regurgitation mild aortic regurgitation ascending aorta 3.9 cm.  Severely dilated left atrium.  Right atrial enlargement.  Pulmonary artery systolic pressure 39 mmHg. October 10, 2023.  LVEF around 55%.  Biatrial enlargement.  Stable aortic root dimension.  Mild to moderate MR AR TR.  Borderline pulmonary pressures. [de-identified] : July 23, 2021.  EKG.  Atrial fibrillation.  Nonspecific ST-T changes August 9, 2022.  Atrial fibrillation.  Nonspecific ST-T changes. October 10, 2023 atrial fibrillation nonspecific ST-T changes [de-identified] : CT angio of the chest for aortic evaluation September 30 8/20/2020.  Atherosclerotic disease of thoracic aorta.  Ascending aorta 4.2 cm abdominal aorta 1.7 cm.  Trace pericardial effusion.  1.6 cm right adrenal nodule.  4 mm nodule of right lower lobe.  Left thyroid nodule

## 2024-04-09 NOTE — ASSESSMENT
[FreeTextEntry1] : Reviewed on October 10, 2023 EKG reviewed from today echocardiogram from today reviewed. October 6, 2023 CBC stable CMP stable LDL 83 HDL 45 total cholesterol 140 N-terminal proBNP 1029  Reviewed on April 9, 2024.  Most recent labs were reviewed.

## 2024-04-09 NOTE — PHYSICAL EXAM
[Well Developed] : well developed [Well Nourished] : well nourished [No Acute Distress] : no acute distress [Obese] : obese [Normal Venous Pressure] : normal venous pressure [No Carotid Bruit] : no carotid bruit [No Rub] : no rub [Clear Lung Fields] : clear lung fields [Good Air Entry] : good air entry [No Respiratory Distress] : no respiratory distress  [Normal Gait] : normal gait [No Edema] : no edema [No Cyanosis] : no cyanosis [No Clubbing] : no clubbing [No Varicosities] : no varicosities [Normal Radial B/L] : normal radial B/L [Moves all extremities] : moves all extremities [Normal Speech] : normal speech [Alert and Oriented] : alert and oriented [de-identified] : Irregularly irregular.  Rate controlled.  Systolic murmur.  Midsystolic murmur.  2/6 left parasternal region.

## 2024-04-09 NOTE — REASON FOR VISIT
[Symptom and Test Evaluation] : symptom and test evaluation [Arrhythmia/ECG Abnorrmalities] : arrhythmia/ECG abnormalities [Structural Heart and Valve Disease] : structural heart and valve disease [Hyperlipidemia] : hyperlipidemia [Hypertension] : hypertension [FreeTextEntry3] : Dr. Harrison [FreeTextEntry1] : 74-year old is seen in the office for follow-up consultation.  We have reviewed recent labs..  Since last seen she has been more aggressive with her diet.  She has not been able to lose weight.  She is going to be more active according to her. She has been seeing endocrinologist.  Has been started on metformin. She denies any worsening of exertional  dyspnea.  No PND orthopnea.  Occasional ankle edema.   She has no chest pain PND orthopnea cough fever or chills No significant good activity or exercise She has no palpitation dizziness lightheadedness She has no bleeding complications She has been compliant with medication No recent hospital admission

## 2024-05-08 ENCOUNTER — RX RENEWAL (OUTPATIENT)
Age: 74
End: 2024-05-08

## 2024-05-08 RX ORDER — ATORVASTATIN CALCIUM 10 MG/1
10 TABLET, FILM COATED ORAL
Qty: 90 | Refills: 3 | Status: ACTIVE | COMMUNITY
Start: 2019-07-20 | End: 1900-01-01

## 2024-05-08 RX ORDER — RIVAROXABAN 20 MG/1
20 TABLET, FILM COATED ORAL
Qty: 90 | Refills: 3 | Status: ACTIVE | COMMUNITY
Start: 2020-05-19 | End: 1900-01-01

## 2024-08-01 ENCOUNTER — NON-APPOINTMENT (OUTPATIENT)
Age: 74
End: 2024-08-01

## 2024-08-02 ENCOUNTER — RX RENEWAL (OUTPATIENT)
Age: 74
End: 2024-08-02

## 2024-10-15 ENCOUNTER — APPOINTMENT (OUTPATIENT)
Dept: RADIOLOGY | Facility: CLINIC | Age: 74
End: 2024-10-15
Payer: MEDICARE

## 2024-10-15 PROCEDURE — 77080 DXA BONE DENSITY AXIAL: CPT

## 2024-10-22 ENCOUNTER — APPOINTMENT (OUTPATIENT)
Dept: CARDIOLOGY | Facility: CLINIC | Age: 74
End: 2024-10-22
Payer: MEDICARE

## 2024-10-22 ENCOUNTER — NON-APPOINTMENT (OUTPATIENT)
Age: 74
End: 2024-10-22

## 2024-10-22 VITALS
OXYGEN SATURATION: 94 % | DIASTOLIC BLOOD PRESSURE: 70 MMHG | HEART RATE: 76 BPM | BODY MASS INDEX: 37.36 KG/M2 | WEIGHT: 203 LBS | SYSTOLIC BLOOD PRESSURE: 126 MMHG | HEIGHT: 62 IN

## 2024-10-22 DIAGNOSIS — I48.20 CHRONIC ATRIAL FIBRILLATION, UNSP: ICD-10-CM

## 2024-10-22 DIAGNOSIS — I10 ESSENTIAL (PRIMARY) HYPERTENSION: ICD-10-CM

## 2024-10-22 DIAGNOSIS — E78.2 MIXED HYPERLIPIDEMIA: ICD-10-CM

## 2024-10-22 DIAGNOSIS — I42.8 OTHER CARDIOMYOPATHIES: ICD-10-CM

## 2024-10-22 DIAGNOSIS — I25.10 ATHEROSCLEROTIC HEART DISEASE OF NATIVE CORONARY ARTERY W/OUT ANGINA PECTORIS: ICD-10-CM

## 2024-10-22 DIAGNOSIS — Z79.01 LONG TERM (CURRENT) USE OF ANTICOAGULANTS: ICD-10-CM

## 2024-10-22 PROCEDURE — 93000 ELECTROCARDIOGRAM COMPLETE: CPT

## 2024-10-22 PROCEDURE — 76376 3D RENDER W/INTRP POSTPROCES: CPT

## 2024-10-22 PROCEDURE — 93306 TTE W/DOPPLER COMPLETE: CPT

## 2024-10-22 PROCEDURE — 99214 OFFICE O/P EST MOD 30 MIN: CPT

## 2024-10-22 PROCEDURE — G2211 COMPLEX E/M VISIT ADD ON: CPT

## 2025-03-14 ENCOUNTER — RX RENEWAL (OUTPATIENT)
Age: 75
End: 2025-03-14

## 2025-04-07 ENCOUNTER — OFFICE (OUTPATIENT)
Dept: URBAN - METROPOLITAN AREA CLINIC 8 | Facility: CLINIC | Age: 75
Setting detail: OPHTHALMOLOGY
End: 2025-04-07
Payer: MEDICARE

## 2025-04-07 DIAGNOSIS — H25.13: ICD-10-CM

## 2025-04-07 DIAGNOSIS — H16.223: ICD-10-CM

## 2025-04-07 DIAGNOSIS — E11.9: ICD-10-CM

## 2025-04-07 DIAGNOSIS — H50.10: ICD-10-CM

## 2025-04-07 DIAGNOSIS — H02.831: ICD-10-CM

## 2025-04-07 DIAGNOSIS — H02.834: ICD-10-CM

## 2025-04-07 DIAGNOSIS — H35.371: ICD-10-CM

## 2025-04-07 PROCEDURE — 92134 CPTRZ OPH DX IMG PST SGM RTA: CPT | Performed by: OPHTHALMOLOGY

## 2025-04-07 PROCEDURE — 92014 COMPRE OPH EXAM EST PT 1/>: CPT | Performed by: OPHTHALMOLOGY

## 2025-04-07 ASSESSMENT — REFRACTION_MANIFEST
OD_VA1: 20/25-2
OD_CYLINDER: -1.00
OD_AXIS: 105
OS_VA1: 20/25
OD_SPHERE: +1.25
OD_ADD: +1.75
OS_CYLINDER: -0.75
OD_VA2: 20/20(J1+)
OS_CYLINDER: -1.00
OD_CYLINDER: -0.75
OS_SPHERE: +0.75
OD_VA1: 20/20
OS_ADD: +1.75
OD_SPHERE: +1.00
OS_AXIS: 075
OS_ADD: +2.50
OD_AXIS: 100
OD_ADD: +2.50
OS_VA1: 20/20
OS_AXIS: 080
OS_VA2: 20/20(J1+)
OU_VA: 20/25+3
OS_SPHERE: +1.00

## 2025-04-07 ASSESSMENT — REFRACTION_CURRENTRX
OS_SPHERE: +2.00
OS_CYLINDER: SPHERE
OS_VPRISM_DIRECTION: SV
OS_OVR_VA: 20/
OD_SPHERE: +2.00
OD_OVR_VA: 20/
OD_VPRISM_DIRECTION: SV
OD_CYLINDER: SPHERE

## 2025-04-07 ASSESSMENT — KERATOMETRY
OD_K1POWER_DIOPTERS: 43.50
OS_K2POWER_DIOPTERS: 44.00
OS_K1POWER_DIOPTERS: 43.25
METHOD_AUTO_MANUAL: AUTO
OS_AXISANGLE_DEGREES: 173
OD_AXISANGLE_DEGREES: 051
OD_K2POWER_DIOPTERS: 43.75

## 2025-04-07 ASSESSMENT — REFRACTION_AUTOREFRACTION
OD_AXIS: 106
OD_CYLINDER: -1.50
OS_SPHERE: +2.25
OS_CYLINDER: -2.25
OS_AXIS: 082
OD_SPHERE: +2.25

## 2025-04-07 ASSESSMENT — TONOMETRY
OS_IOP_MMHG: 14
OD_IOP_MMHG: 14

## 2025-04-07 ASSESSMENT — CONFRONTATIONAL VISUAL FIELD TEST (CVF)
OS_FINDINGS: FULL
OD_FINDINGS: FULL

## 2025-04-07 ASSESSMENT — VISUAL ACUITY
OD_BCVA: 20/25-2
OS_BCVA: 20/25+2

## 2025-04-07 ASSESSMENT — LID POSITION - DERMATOCHALASIS
OD_DERMATOCHALASIS: RUL 2+
OS_DERMATOCHALASIS: LUL 2+

## 2025-04-07 ASSESSMENT — SUPERFICIAL PUNCTATE KERATITIS (SPK)
OS_SPK: T
OD_SPK: T

## 2025-04-07 ASSESSMENT — TEAR BREAK UP TIME (TBUT)
OS_TBUT: 8 SECS
OD_TBUT: 8 SECS

## 2025-04-09 ENCOUNTER — NON-APPOINTMENT (OUTPATIENT)
Age: 75
End: 2025-04-09

## 2025-04-09 ENCOUNTER — APPOINTMENT (OUTPATIENT)
Dept: CARDIOLOGY | Facility: CLINIC | Age: 75
End: 2025-04-09
Payer: MEDICARE

## 2025-04-09 VITALS
RESPIRATION RATE: 16 BRPM | HEIGHT: 62 IN | OXYGEN SATURATION: 96 % | HEART RATE: 90 BPM | SYSTOLIC BLOOD PRESSURE: 120 MMHG | DIASTOLIC BLOOD PRESSURE: 70 MMHG

## 2025-04-09 DIAGNOSIS — I10 ESSENTIAL (PRIMARY) HYPERTENSION: ICD-10-CM

## 2025-04-09 DIAGNOSIS — I71.20 THORACIC AORTIC ANEURYSM, WITHOUT RUPTURE, UNSPECIFIED: ICD-10-CM

## 2025-04-09 DIAGNOSIS — I48.20 CHRONIC ATRIAL FIBRILLATION, UNSP: ICD-10-CM

## 2025-04-09 DIAGNOSIS — Z79.01 LONG TERM (CURRENT) USE OF ANTICOAGULANTS: ICD-10-CM

## 2025-04-09 DIAGNOSIS — I42.8 OTHER CARDIOMYOPATHIES: ICD-10-CM

## 2025-04-09 DIAGNOSIS — E78.2 MIXED HYPERLIPIDEMIA: ICD-10-CM

## 2025-04-09 PROCEDURE — G2211 COMPLEX E/M VISIT ADD ON: CPT

## 2025-04-09 PROCEDURE — 93000 ELECTROCARDIOGRAM COMPLETE: CPT

## 2025-04-09 PROCEDURE — 99214 OFFICE O/P EST MOD 30 MIN: CPT

## 2025-05-01 ENCOUNTER — OFFICE (OUTPATIENT)
Dept: URBAN - METROPOLITAN AREA CLINIC 8 | Facility: CLINIC | Age: 75
Setting detail: OPHTHALMOLOGY
End: 2025-05-01
Payer: MEDICARE

## 2025-05-01 DIAGNOSIS — H02.831: ICD-10-CM

## 2025-05-01 DIAGNOSIS — H53.40: ICD-10-CM

## 2025-05-01 DIAGNOSIS — H02.834: ICD-10-CM

## 2025-05-01 PROBLEM — H35.371 EPIRETINAL MEMBRANE; RIGHT EYE: Status: ACTIVE | Noted: 2025-04-07

## 2025-05-01 PROCEDURE — 99214 OFFICE O/P EST MOD 30 MIN: CPT | Performed by: STUDENT IN AN ORGANIZED HEALTH CARE EDUCATION/TRAINING PROGRAM

## 2025-05-01 PROCEDURE — 92285 EXTERNAL OCULAR PHOTOGRAPHY: CPT | Performed by: STUDENT IN AN ORGANIZED HEALTH CARE EDUCATION/TRAINING PROGRAM

## 2025-05-01 PROCEDURE — 92082 INTERMEDIATE VISUAL FIELD XM: CPT | Performed by: STUDENT IN AN ORGANIZED HEALTH CARE EDUCATION/TRAINING PROGRAM

## 2025-05-01 PROCEDURE — SCCOS COSMETIC CONSULTATION: Performed by: STUDENT IN AN ORGANIZED HEALTH CARE EDUCATION/TRAINING PROGRAM

## 2025-05-01 ASSESSMENT — KERATOMETRY
OS_K2POWER_DIOPTERS: 44.00
OS_AXISANGLE_DEGREES: 173
METHOD_AUTO_MANUAL: AUTO
OD_K2POWER_DIOPTERS: 43.75
OD_AXISANGLE_DEGREES: 051
OS_K1POWER_DIOPTERS: 43.25
OD_K1POWER_DIOPTERS: 43.50

## 2025-05-01 ASSESSMENT — LID EXAM ASSESSMENTS
OS_MRD1: 3.5 MM
OS_MEDIAL_FAT_PROLAPSE: ABSENT
OD_MRD1: 3.5 MM
OD_LEVATOR_FUNCTION: 15 MM
OD_LAXITY: 3+
OS_COMMENTS: GOOD SNAP BACK LL COMMENTS
OD_COMMENTS: GOOD SNAP BACK LL COMMENTS
OD_LID_CREASE: 9MM
OS_LID_CREASE: 9MM
OS_BROW_PTOSIS: 2+
OS_MRD2: 5.5MM
OS_LAXITY: 3+
OD_MRD2: 5.5MM
OS_COMMENTS: GOOD SNAP BACK LL COMMENT
OD_COMMENTS: GOOD SNAP BACK LL COMMENT
OD_MEDIAL_FAT_PROLAPSE: ABSENT
OS_LEVATOR_FUNCTION: 15 MM

## 2025-05-01 ASSESSMENT — REFRACTION_AUTOREFRACTION
OS_AXIS: 082
OD_CYLINDER: -1.50
OS_CYLINDER: -2.25
OD_AXIS: 106
OS_SPHERE: +2.25
OD_SPHERE: +2.25

## 2025-05-01 ASSESSMENT — SUPERFICIAL PUNCTATE KERATITIS (SPK)
OS_SPK: T
OD_SPK: T

## 2025-05-01 ASSESSMENT — LID POSITION - DERMATOCHALASIS
OS_DERMATOCHALASIS: LUL 2+
OD_DERMATOCHALASIS: RUL 2+

## 2025-05-01 ASSESSMENT — TEAR BREAK UP TIME (TBUT)
OD_TBUT: 8 SECS
OS_TBUT: 8 SECS

## 2025-05-01 ASSESSMENT — CONFRONTATIONAL VISUAL FIELD TEST (CVF)
OS_FINDINGS: FULL
OD_FINDINGS: FULL

## 2025-05-01 ASSESSMENT — VISUAL ACUITY
OS_BCVA: 20/25
OD_BCVA: 20/25

## 2025-05-15 ENCOUNTER — RX RENEWAL (OUTPATIENT)
Age: 75
End: 2025-05-15

## 2025-05-22 ENCOUNTER — RX RENEWAL (OUTPATIENT)
Age: 75
End: 2025-05-22

## 2025-05-23 ENCOUNTER — APPOINTMENT (OUTPATIENT)
Dept: ULTRASOUND IMAGING | Facility: CLINIC | Age: 75
End: 2025-05-23
Payer: MEDICARE

## 2025-05-23 PROCEDURE — 76536 US EXAM OF HEAD AND NECK: CPT

## 2025-06-24 ENCOUNTER — APPOINTMENT (OUTPATIENT)
Dept: CARDIOLOGY | Facility: CLINIC | Age: 75
End: 2025-06-24
Payer: MEDICARE

## 2025-06-24 ENCOUNTER — NON-APPOINTMENT (OUTPATIENT)
Age: 75
End: 2025-06-24

## 2025-06-24 VITALS
SYSTOLIC BLOOD PRESSURE: 120 MMHG | HEART RATE: 85 BPM | BODY MASS INDEX: 35.7 KG/M2 | DIASTOLIC BLOOD PRESSURE: 84 MMHG | WEIGHT: 194 LBS | OXYGEN SATURATION: 97 % | HEIGHT: 62 IN

## 2025-06-24 PROCEDURE — 99214 OFFICE O/P EST MOD 30 MIN: CPT

## 2025-06-24 RX ORDER — SEMAGLUTIDE 1.34 MG/ML
4 INJECTION, SOLUTION SUBCUTANEOUS WEEKLY
Refills: 0 | Status: ACTIVE | COMMUNITY

## 2025-07-03 ENCOUNTER — OFFICE (OUTPATIENT)
Dept: URBAN - METROPOLITAN AREA CLINIC 8 | Facility: CLINIC | Age: 75
Setting detail: OPHTHALMOLOGY
End: 2025-07-03
Payer: MEDICARE

## 2025-07-03 DIAGNOSIS — H02.834: ICD-10-CM

## 2025-07-03 DIAGNOSIS — H02.831: ICD-10-CM

## 2025-07-03 DIAGNOSIS — H16.223: ICD-10-CM

## 2025-07-03 PROCEDURE — 99213 OFFICE O/P EST LOW 20 MIN: CPT | Performed by: STUDENT IN AN ORGANIZED HEALTH CARE EDUCATION/TRAINING PROGRAM

## 2025-07-03 ASSESSMENT — LID EXAM ASSESSMENTS
OD_COMMENTS: GOOD SNAP BACK LL COMMENTS
OS_LAXITY: 3+
OS_MEDIAL_FAT_PROLAPSE: ABSENT
OD_MRD2: 5.5MM
OD_LAXITY: 3+
OS_COMMENTS: GOOD SNAP BACK LL COMMENT
OD_COMMENTS: GOOD SNAP BACK LL COMMENT
OD_LEVATOR_FUNCTION: 15 MM
OD_MRD1: 3.5 MM
OS_BROW_PTOSIS: 2+
OS_LID_CREASE: 9MM
OD_LID_CREASE: 9MM
OS_COMMENTS: GOOD SNAP BACK LL COMMENTS
OS_MRD1: 3.5 MM
OD_MEDIAL_FAT_PROLAPSE: ABSENT
OS_MRD2: 5.5MM
OS_LEVATOR_FUNCTION: 15 MM

## 2025-07-03 ASSESSMENT — KERATOMETRY
OD_K2POWER_DIOPTERS: 43.75
OD_K1POWER_DIOPTERS: 43.50
METHOD_AUTO_MANUAL: AUTO
OS_AXISANGLE_DEGREES: 173
OD_AXISANGLE_DEGREES: 051
OS_K1POWER_DIOPTERS: 43.25
OS_K2POWER_DIOPTERS: 44.00

## 2025-07-03 ASSESSMENT — REFRACTION_AUTOREFRACTION
OD_CYLINDER: -1.50
OD_AXIS: 106
OS_SPHERE: +2.25
OS_CYLINDER: -2.25
OS_AXIS: 082
OD_SPHERE: +2.25

## 2025-07-03 ASSESSMENT — VISUAL ACUITY
OD_BCVA: 20/25
OS_BCVA: 20/25

## 2025-07-03 ASSESSMENT — CONFRONTATIONAL VISUAL FIELD TEST (CVF)
OD_FINDINGS: FULL
OS_FINDINGS: FULL

## 2025-07-03 ASSESSMENT — TEAR BREAK UP TIME (TBUT)
OD_TBUT: 8 SECS
OS_TBUT: 8 SECS

## 2025-07-03 ASSESSMENT — LID POSITION - DERMATOCHALASIS
OS_DERMATOCHALASIS: LUL 2+
OD_DERMATOCHALASIS: RUL 2+

## 2025-07-03 ASSESSMENT — SUPERFICIAL PUNCTATE KERATITIS (SPK)
OD_SPK: T
OS_SPK: T

## 2025-07-24 ENCOUNTER — AMBULATORY SURGERY CENTER (OUTPATIENT)
Dept: URBAN - METROPOLITAN AREA SURGERY 4 | Facility: SURGERY | Age: 75
Setting detail: OPHTHALMOLOGY
End: 2025-07-24
Payer: MEDICARE

## 2025-07-24 DIAGNOSIS — H02.831: ICD-10-CM

## 2025-07-24 DIAGNOSIS — H02.834: ICD-10-CM

## 2025-07-24 PROCEDURE — 15823 BLEPHARP UPR EYELID XCSV SKN: CPT | Mod: 50 | Performed by: STUDENT IN AN ORGANIZED HEALTH CARE EDUCATION/TRAINING PROGRAM

## 2025-07-30 ENCOUNTER — OFFICE (OUTPATIENT)
Dept: URBAN - METROPOLITAN AREA CLINIC 38 | Facility: CLINIC | Age: 75
Setting detail: OPHTHALMOLOGY
End: 2025-07-30
Payer: MEDICARE

## 2025-07-30 ENCOUNTER — RX RENEWAL (OUTPATIENT)
Age: 75
End: 2025-07-30

## 2025-07-30 ENCOUNTER — RX ONLY (RX ONLY)
Age: 75
End: 2025-07-30

## 2025-07-30 DIAGNOSIS — H02.831: ICD-10-CM

## 2025-07-30 DIAGNOSIS — H02.834: ICD-10-CM

## 2025-07-30 PROCEDURE — 99024 POSTOP FOLLOW-UP VISIT: CPT | Performed by: STUDENT IN AN ORGANIZED HEALTH CARE EDUCATION/TRAINING PROGRAM

## 2025-07-30 ASSESSMENT — KERATOMETRY
OS_AXISANGLE_DEGREES: 173
OD_K1POWER_DIOPTERS: 43.50
METHOD_AUTO_MANUAL: AUTO
OD_K2POWER_DIOPTERS: 43.75
OD_AXISANGLE_DEGREES: 051
OS_K2POWER_DIOPTERS: 44.00
OS_K1POWER_DIOPTERS: 43.25

## 2025-07-30 ASSESSMENT — LID EXAM ASSESSMENTS
OS_COMMENTS: GOOD SNAP BACK LL COMMENTS
OS_MRD2: 5.5MM
OD_LID_CREASE: 9MM
OD_MRD1: 3.5 MM
OS_LAXITY: 3+
OD_DERMATOCHALASIS: ABSENT
OS_BROW_PTOSIS: 2+
OS_COMMENTS: GOOD SNAP BACK LL COMMENT
OS_DERMATOCHALASIS: ABSENT
OD_COMMENTS: GOOD SNAP BACK LL COMMENT
OS_MRD1: 3.5 MM
OD_COMMENTS: GOOD SNAP BACK LL COMMENTS
OD_MRD2: 5.5MM
OS_LID_CREASE: 9MM
OD_LAXITY: 3+
OD_LEVATOR_FUNCTION: 15 MM
OS_LEVATOR_FUNCTION: 15 MM

## 2025-07-30 ASSESSMENT — REFRACTION_AUTOREFRACTION
OS_CYLINDER: -2.25
OD_AXIS: 106
OD_SPHERE: +2.25
OS_AXIS: 082
OS_SPHERE: +2.25
OD_CYLINDER: -1.50

## 2025-07-30 ASSESSMENT — SUPERFICIAL PUNCTATE KERATITIS (SPK)
OS_SPK: T
OD_SPK: T

## 2025-07-30 ASSESSMENT — CONFRONTATIONAL VISUAL FIELD TEST (CVF)
OS_FINDINGS: FULL
OD_FINDINGS: FULL

## 2025-07-30 ASSESSMENT — LID POSITION - DERMATOCHALASIS
OS_DERMATOCHALASIS: ABSENT
OD_DERMATOCHALASIS: ABSENT

## 2025-07-30 ASSESSMENT — VISUAL ACUITY
OD_BCVA: 20/25+3
OS_BCVA: 20/20-

## 2025-07-30 ASSESSMENT — TEAR BREAK UP TIME (TBUT)
OD_TBUT: 8 SECS
OS_TBUT: 8 SECS